# Patient Record
Sex: MALE | Race: WHITE | NOT HISPANIC OR LATINO | Employment: OTHER | URBAN - METROPOLITAN AREA
[De-identification: names, ages, dates, MRNs, and addresses within clinical notes are randomized per-mention and may not be internally consistent; named-entity substitution may affect disease eponyms.]

---

## 2021-09-20 ENCOUNTER — HISTORICAL (OUTPATIENT)
Dept: CARDIOLOGY | Facility: HOSPITAL | Age: 61
End: 2021-09-20

## 2021-10-21 ENCOUNTER — HISTORICAL (OUTPATIENT)
Dept: CARDIOLOGY | Facility: HOSPITAL | Age: 61
End: 2021-10-21

## 2021-11-22 ENCOUNTER — HISTORICAL (OUTPATIENT)
Dept: CARDIOLOGY | Facility: HOSPITAL | Age: 61
End: 2021-11-22

## 2021-12-21 ENCOUNTER — HISTORICAL (OUTPATIENT)
Dept: CARDIOLOGY | Facility: HOSPITAL | Age: 61
End: 2021-12-21

## 2021-12-23 ENCOUNTER — HISTORICAL (OUTPATIENT)
Dept: CARDIOLOGY | Facility: HOSPITAL | Age: 61
End: 2021-12-23

## 2022-01-12 ENCOUNTER — HISTORICAL (OUTPATIENT)
Dept: CARDIOLOGY | Facility: HOSPITAL | Age: 62
End: 2022-01-12

## 2022-01-12 LAB
INR PPP: 3.9 (ref 0–1.3)
PROTHROMBIN TIME: 37.5 SECOND(S) (ref 12.5–14.5)

## 2022-01-19 ENCOUNTER — HISTORICAL (OUTPATIENT)
Dept: CARDIOLOGY | Facility: HOSPITAL | Age: 62
End: 2022-01-19

## 2022-01-19 LAB
INR PPP: 1.1
PROTHROMBIN TIME: 13.5 S

## 2022-01-25 ENCOUNTER — HISTORICAL (OUTPATIENT)
Dept: ADMINISTRATIVE | Facility: HOSPITAL | Age: 62
End: 2022-01-25

## 2022-01-25 LAB
INR PPP: 2.4 (ref 0–1.3)
PROTHROMBIN TIME: 25.3 SECOND(S) (ref 12.5–14.5)

## 2022-02-02 ENCOUNTER — HISTORICAL (OUTPATIENT)
Dept: ADMINISTRATIVE | Facility: HOSPITAL | Age: 62
End: 2022-02-02

## 2022-02-02 LAB
INR PPP: 3.6 (ref 0–1.3)
PROTHROMBIN TIME: 35.3 S (ref 12.5–14.5)

## 2022-02-17 ENCOUNTER — HISTORICAL (OUTPATIENT)
Dept: ADMINISTRATIVE | Facility: HOSPITAL | Age: 62
End: 2022-02-17

## 2022-02-17 LAB
INR PPP: 2.2 (ref 0–1.3)
PROTHROMBIN TIME: 24.2 S (ref 12.5–14.5)

## 2022-03-14 ENCOUNTER — HISTORICAL (OUTPATIENT)
Dept: ADMINISTRATIVE | Facility: HOSPITAL | Age: 62
End: 2022-03-14

## 2022-03-14 LAB
INR PPP: 3.8 (ref 0–1.3)
PROTHROMBIN TIME: 37.2 S (ref 12.5–14.5)

## 2022-03-29 ENCOUNTER — HISTORICAL (OUTPATIENT)
Dept: ADMINISTRATIVE | Facility: HOSPITAL | Age: 62
End: 2022-03-29

## 2022-03-29 LAB
INR PPP: 1.3 (ref 0–1.3)
PROTHROMBIN TIME: 16.3 S (ref 12.5–14.5)

## 2022-04-13 ENCOUNTER — HISTORICAL (OUTPATIENT)
Dept: ADMINISTRATIVE | Facility: HOSPITAL | Age: 62
End: 2022-04-13
Payer: COMMERCIAL

## 2022-04-13 LAB
INR PPP: 1.4 (ref 0–1.3)
PROTHROMBIN TIME: 16.8 S (ref 12.5–14.5)

## 2022-04-22 ENCOUNTER — HISTORICAL (OUTPATIENT)
Dept: ADMINISTRATIVE | Facility: HOSPITAL | Age: 62
End: 2022-04-22
Payer: COMMERCIAL

## 2022-04-22 LAB
INR PPP: 2.4 (ref 0–1.3)
PROTHROMBIN TIME: 25.4 S (ref 12.5–14.5)

## 2022-04-27 ENCOUNTER — HISTORICAL (OUTPATIENT)
Dept: ADMINISTRATIVE | Facility: HOSPITAL | Age: 62
End: 2022-04-27
Payer: COMMERCIAL

## 2022-04-27 LAB
INR PPP: 3.4 (ref 0–1.3)
PROTHROMBIN TIME: 33.8 S (ref 12.5–14.5)

## 2022-05-11 ENCOUNTER — LAB VISIT (OUTPATIENT)
Dept: LAB | Facility: HOSPITAL | Age: 62
End: 2022-05-11
Attending: INTERNAL MEDICINE
Payer: COMMERCIAL

## 2022-05-11 DIAGNOSIS — Z95.2 HEART VALVE REPLACED BY TRANSPLANT: Primary | ICD-10-CM

## 2022-05-11 LAB
INR BLD: 3.36 (ref 0–1.3)
PROTHROMBIN TIME: 33.5 SECONDS (ref 12.5–14.5)

## 2022-05-11 PROCEDURE — 85610 PROTHROMBIN TIME: CPT

## 2022-05-11 PROCEDURE — 36415 COLL VENOUS BLD VENIPUNCTURE: CPT

## 2022-05-25 ENCOUNTER — LAB VISIT (OUTPATIENT)
Dept: LAB | Facility: HOSPITAL | Age: 62
End: 2022-05-25
Attending: INTERNAL MEDICINE
Payer: COMMERCIAL

## 2022-05-25 DIAGNOSIS — Z95.2 HEART VALVE REPLACED BY TRANSPLANT: Primary | ICD-10-CM

## 2022-05-25 LAB
INR BLD: 2.88 (ref 0–1.3)
PROTHROMBIN TIME: 29.7 SECONDS (ref 12.5–14.5)

## 2022-05-25 PROCEDURE — 85610 PROTHROMBIN TIME: CPT

## 2022-05-25 PROCEDURE — 36415 COLL VENOUS BLD VENIPUNCTURE: CPT

## 2022-06-15 ENCOUNTER — LAB VISIT (OUTPATIENT)
Dept: LAB | Facility: HOSPITAL | Age: 62
End: 2022-06-15
Attending: INTERNAL MEDICINE
Payer: COMMERCIAL

## 2022-06-15 DIAGNOSIS — Z95.2 HEART VALVE REPLACED BY TRANSPLANT: Primary | ICD-10-CM

## 2022-06-15 LAB
INR BLD: 3.95 (ref 0–1.3)
PROTHROMBIN TIME: 37.7 SECONDS (ref 12.5–14.5)

## 2022-06-15 PROCEDURE — 85610 PROTHROMBIN TIME: CPT

## 2022-06-15 PROCEDURE — 36415 COLL VENOUS BLD VENIPUNCTURE: CPT

## 2022-07-21 ENCOUNTER — LAB VISIT (OUTPATIENT)
Dept: LAB | Facility: HOSPITAL | Age: 62
End: 2022-07-21
Attending: INTERNAL MEDICINE
Payer: COMMERCIAL

## 2022-07-21 DIAGNOSIS — Z95.2 HEART VALVE REPLACED BY TRANSPLANT: Primary | ICD-10-CM

## 2022-07-21 LAB
INR BLD: 4.34 (ref 0–1.3)
PROTHROMBIN TIME: 40.5 SECONDS (ref 12.5–14.5)

## 2022-07-21 PROCEDURE — 36415 COLL VENOUS BLD VENIPUNCTURE: CPT

## 2022-07-21 PROCEDURE — 85610 PROTHROMBIN TIME: CPT

## 2022-08-21 ENCOUNTER — HOSPITAL ENCOUNTER (OUTPATIENT)
Facility: HOSPITAL | Age: 62
Discharge: HOME | DRG: 155 | End: 2022-08-21
Attending: EMERGENCY MEDICINE | Admitting: INTERNAL MEDICINE
Payer: COMMERCIAL

## 2022-08-21 VITALS
HEART RATE: 59 BPM | DIASTOLIC BLOOD PRESSURE: 71 MMHG | OXYGEN SATURATION: 96 % | TEMPERATURE: 98 F | RESPIRATION RATE: 20 BRPM | SYSTOLIC BLOOD PRESSURE: 134 MMHG

## 2022-08-21 DIAGNOSIS — R41.82 ALTERED MENTAL STATUS: ICD-10-CM

## 2022-08-21 DIAGNOSIS — G47.59 OTHER PARASOMNIA: ICD-10-CM

## 2022-08-21 DIAGNOSIS — G93.40 ACUTE ENCEPHALOPATHY: Primary | ICD-10-CM

## 2022-08-21 PROBLEM — G47.50 PARASOMNIA: Status: ACTIVE | Noted: 2022-08-21

## 2022-08-21 PROBLEM — G47.419 NARCOLEPSY: Status: ACTIVE | Noted: 2022-08-21

## 2022-08-21 LAB
ALBUMIN SERPL-MCNC: 3.3 GM/DL (ref 3.4–4.8)
ALBUMIN/GLOB SERPL: 1 RATIO (ref 1.1–2)
ALP SERPL-CCNC: 86 UNIT/L (ref 40–150)
ALT SERPL-CCNC: 10 UNIT/L (ref 0–55)
AMMONIA PLAS-MSCNC: 25.8 UMOL/L (ref 18–72)
AMPHET UR QL SCN: NEGATIVE
APPEARANCE UR: CLEAR
AST SERPL-CCNC: 13 UNIT/L (ref 5–34)
BACTERIA #/AREA URNS AUTO: NORMAL /HPF
BARBITURATE SCN PRESENT UR: NEGATIVE
BASOPHILS # BLD AUTO: 0.04 X10(3)/MCL (ref 0–0.2)
BASOPHILS NFR BLD AUTO: 0.6 %
BENZODIAZ UR QL SCN: NEGATIVE
BILIRUB UR QL STRIP.AUTO: NEGATIVE MG/DL
BILIRUBIN DIRECT+TOT PNL SERPL-MCNC: 0.3 MG/DL
BUN SERPL-MCNC: 26.2 MG/DL (ref 8.4–25.7)
CALCIUM SERPL-MCNC: 9.1 MG/DL (ref 8.8–10)
CANNABINOIDS UR QL SCN: NEGATIVE
CHLORIDE SERPL-SCNC: 105 MMOL/L (ref 98–107)
CO2 SERPL-SCNC: 25 MMOL/L (ref 23–31)
COCAINE UR QL SCN: NEGATIVE
COLOR UR AUTO: YELLOW
CORRECTED TEMPERATURE (PCO2): 46 MMHG (ref 35–45)
CORRECTED TEMPERATURE (PH): 7.4 (ref 7.35–7.45)
CORRECTED TEMPERATURE (PO2): 68 MMHG (ref 80–100)
CREAT SERPL-MCNC: 1.5 MG/DL (ref 0.73–1.18)
EOSINOPHIL # BLD AUTO: 0.21 X10(3)/MCL (ref 0–0.9)
EOSINOPHIL NFR BLD AUTO: 3.4 %
ERYTHROCYTE [DISTWIDTH] IN BLOOD BY AUTOMATED COUNT: 20.3 % (ref 11.5–17)
ETHANOL SERPL-MCNC: <10 MG/DL
FENTANYL UR QL SCN: NEGATIVE
GFR SERPLBLD CREATININE-BSD FMLA CKD-EPI: 53 MLS/MIN/1.73/M2
GLOBULIN SER-MCNC: 3.2 GM/DL (ref 2.4–3.5)
GLUCOSE SERPL-MCNC: 165 MG/DL (ref 82–115)
GLUCOSE UR QL STRIP.AUTO: NEGATIVE MG/DL
HCO3 UR-SCNC: 28.5 MMOL/L (ref 22–26)
HCT VFR BLD AUTO: 29.1 % (ref 42–52)
HGB BLD-MCNC: 8.4 GM/DL (ref 14–18)
HGB BLD-MCNC: 8.8 G/DL (ref 12–16)
IMM GRANULOCYTES # BLD AUTO: 0.02 X10(3)/MCL (ref 0–0.04)
IMM GRANULOCYTES NFR BLD AUTO: 0.3 %
INR BLD: 3.11 (ref 0–1.3)
KETONES UR QL STRIP.AUTO: NEGATIVE MG/DL
LEUKOCYTE ESTERASE UR QL STRIP.AUTO: NEGATIVE UNIT/L
LYMPHOCYTES # BLD AUTO: 1.69 X10(3)/MCL (ref 0.6–4.6)
LYMPHOCYTES NFR BLD AUTO: 27.4 %
MCH RBC QN AUTO: 26.2 PG (ref 27–31)
MCHC RBC AUTO-ENTMCNC: 28.9 MG/DL (ref 33–36)
MCV RBC AUTO: 90.7 FL (ref 80–94)
MDMA UR QL SCN: NEGATIVE
MONOCYTES # BLD AUTO: 0.66 X10(3)/MCL (ref 0.1–1.3)
MONOCYTES NFR BLD AUTO: 10.7 %
NEUTROPHILS # BLD AUTO: 3.5 X10(3)/MCL (ref 2.1–9.2)
NEUTROPHILS NFR BLD AUTO: 57.6 %
NITRITE UR QL STRIP.AUTO: NEGATIVE
NRBC BLD AUTO-RTO: 0 %
OPIATES UR QL SCN: NEGATIVE
PCO2 BLDA: 46 MMHG (ref 35–45)
PCP UR QL: NEGATIVE
PH SMN: 7.4 [PH] (ref 7.35–7.45)
PH UR STRIP.AUTO: 6 [PH]
PH UR: 6 [PH] (ref 3–11)
PLATELET # BLD AUTO: 358 X10(3)/MCL (ref 130–400)
PMV BLD AUTO: 8.8 FL (ref 7.4–10.4)
PO2 BLDA: 68 MMHG (ref 80–100)
POC BASE DEFICIT: 3.2 MMOL/L (ref -2–2)
POC COHB: 4.9 %
POC IONIZED CALCIUM: 1.17 MMOL/L (ref 1.12–1.23)
POC METHB: 0.4 % (ref 0.4–1.5)
POC O2HB: 90.8 % (ref 94–97)
POC SATURATED O2: 93.3 %
POC TEMPERATURE: 37 °C
POTASSIUM BLD-SCNC: 3.9 MMOL/L (ref 3.5–5)
POTASSIUM SERPL-SCNC: 4.2 MMOL/L (ref 3.5–5.1)
PROT SERPL-MCNC: 6.5 GM/DL (ref 5.8–7.6)
PROT UR QL STRIP.AUTO: NEGATIVE MG/DL
PROTHROMBIN TIME: 31.4 SECONDS (ref 12.5–14.5)
RBC # BLD AUTO: 3.21 X10(6)/MCL (ref 4.7–6.1)
RBC #/AREA URNS AUTO: <5 /HPF
RBC UR QL AUTO: NEGATIVE UNIT/L
SODIUM BLD-SCNC: 135 MMOL/L (ref 137–145)
SODIUM SERPL-SCNC: 139 MMOL/L (ref 136–145)
SP GR UR STRIP.AUTO: 1.01 (ref 1–1.03)
SPECIFIC GRAVITY, URINE AUTO (.000) (OHS): 1.01 (ref 1–1.03)
SPECIMEN SOURCE: ABNORMAL
SQUAMOUS #/AREA URNS AUTO: <5 /HPF
UROBILINOGEN UR STRIP-ACNC: 0.2 MG/DL
WBC # SPEC AUTO: 6.2 X10(3)/MCL (ref 4.5–11.5)
WBC #/AREA URNS AUTO: <5 /HPF

## 2022-08-21 PROCEDURE — 36600 WITHDRAWAL OF ARTERIAL BLOOD: CPT

## 2022-08-21 PROCEDURE — 93010 ELECTROCARDIOGRAM REPORT: CPT | Mod: ,,, | Performed by: INTERNAL MEDICINE

## 2022-08-21 PROCEDURE — 82077 ASSAY SPEC XCP UR&BREATH IA: CPT | Performed by: EMERGENCY MEDICINE

## 2022-08-21 PROCEDURE — 81001 URINALYSIS AUTO W/SCOPE: CPT | Performed by: EMERGENCY MEDICINE

## 2022-08-21 PROCEDURE — 99285 EMERGENCY DEPT VISIT HI MDM: CPT | Mod: 25

## 2022-08-21 PROCEDURE — 80053 COMPREHEN METABOLIC PANEL: CPT | Performed by: EMERGENCY MEDICINE

## 2022-08-21 PROCEDURE — 96360 HYDRATION IV INFUSION INIT: CPT

## 2022-08-21 PROCEDURE — 99900035 HC TECH TIME PER 15 MIN (STAT)

## 2022-08-21 PROCEDURE — 82803 BLOOD GASES ANY COMBINATION: CPT

## 2022-08-21 PROCEDURE — 25000003 PHARM REV CODE 250: Performed by: EMERGENCY MEDICINE

## 2022-08-21 PROCEDURE — G0378 HOSPITAL OBSERVATION PER HR: HCPCS

## 2022-08-21 PROCEDURE — 93010 EKG 12-LEAD: ICD-10-PCS | Mod: ,,, | Performed by: INTERNAL MEDICINE

## 2022-08-21 PROCEDURE — 36415 COLL VENOUS BLD VENIPUNCTURE: CPT | Performed by: EMERGENCY MEDICINE

## 2022-08-21 PROCEDURE — 80307 DRUG TEST PRSMV CHEM ANLYZR: CPT | Performed by: EMERGENCY MEDICINE

## 2022-08-21 PROCEDURE — 82140 ASSAY OF AMMONIA: CPT | Performed by: EMERGENCY MEDICINE

## 2022-08-21 PROCEDURE — 11000001 HC ACUTE MED/SURG PRIVATE ROOM

## 2022-08-21 PROCEDURE — 85610 PROTHROMBIN TIME: CPT | Performed by: EMERGENCY MEDICINE

## 2022-08-21 PROCEDURE — 85025 COMPLETE CBC W/AUTO DIFF WBC: CPT | Performed by: EMERGENCY MEDICINE

## 2022-08-21 PROCEDURE — 93005 ELECTROCARDIOGRAM TRACING: CPT

## 2022-08-21 RX ORDER — WARFARIN 2.5 MG/1
2.5 TABLET ORAL
COMMUNITY
Start: 2021-09-20

## 2022-08-21 RX ORDER — DULAGLUTIDE 1.5 MG/.5ML
1 INJECTION, SOLUTION SUBCUTANEOUS WEEKLY
COMMUNITY
Start: 2022-08-15

## 2022-08-21 RX ORDER — PAROXETINE HYDROCHLORIDE 20 MG/1
20 TABLET, FILM COATED ORAL EVERY MORNING
COMMUNITY

## 2022-08-21 RX ORDER — PANTOPRAZOLE SODIUM 40 MG/1
1 TABLET, DELAYED RELEASE ORAL EVERY MORNING
COMMUNITY
Start: 2022-07-02 | End: 2023-07-02

## 2022-08-21 RX ORDER — ASPIRIN 81 MG/1
1 TABLET ORAL DAILY
COMMUNITY
Start: 2021-09-20

## 2022-08-21 RX ORDER — LISINOPRIL 40 MG/1
40 TABLET ORAL
Status: ON HOLD | COMMUNITY
End: 2022-09-13 | Stop reason: HOSPADM

## 2022-08-21 RX ORDER — FERROUS SULFATE 325(65) MG
1 TABLET, DELAYED RELEASE (ENTERIC COATED) ORAL
COMMUNITY
Start: 2022-07-04

## 2022-08-21 RX ORDER — ATORVASTATIN CALCIUM 80 MG/1
80 TABLET, FILM COATED ORAL NIGHTLY
COMMUNITY
Start: 2021-09-20

## 2022-08-21 RX ORDER — METHADONE HYDROCHLORIDE 10 MG/5ML
70 SOLUTION ORAL
COMMUNITY

## 2022-08-21 RX ORDER — GABAPENTIN 400 MG/1
400 CAPSULE ORAL 3 TIMES DAILY
COMMUNITY
Start: 2022-06-06

## 2022-08-21 RX ORDER — METFORMIN HYDROCHLORIDE 1000 MG/1
1000 TABLET ORAL
Status: ON HOLD | COMMUNITY
Start: 2021-09-20 | End: 2022-09-13 | Stop reason: HOSPADM

## 2022-08-21 RX ORDER — ALBUTEROL SULFATE 0.83 MG/ML
SOLUTION RESPIRATORY (INHALATION)
Status: DISCONTINUED
Start: 2022-08-21 | End: 2022-08-21 | Stop reason: HOSPADM

## 2022-08-21 RX ORDER — FOLIC ACID 1 MG/1
1000 TABLET ORAL DAILY
COMMUNITY
Start: 2022-08-15

## 2022-08-21 RX ADMIN — SODIUM CHLORIDE 1000 ML: 9 INJECTION, SOLUTION INTRAVENOUS at 01:08
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
## 2022-08-22 ENCOUNTER — PATIENT OUTREACH (OUTPATIENT)
Dept: ADMINISTRATIVE | Facility: CLINIC | Age: 62
End: 2022-08-22
Payer: COMMERCIAL

## 2022-08-22 NOTE — PROGRESS NOTES
C3 nurse attempted to contact Christian Stroud for a TCC post hospital discharge follow up call. No answer. No voicemail available.The patient does not have a scheduled HOSFU appointment noted.

## 2022-08-23 NOTE — CLINICAL REVIEW
61-year-old male with DM 2, HTN, narcolepsy, opioid addiction who presented with acute encephalopathy after taking 2 Ambien pills.  He was admitted.  Workup was unremarkable.  Discharged within 24 hours after complete resolution of his symptoms.  There was no evidence of acute stroke, hypertensive encephalopathy, acute coronary syndrome, acute infection, failure of observation level of care.  He was appropriate for observation level of care    Maria Del Carmen Otero MD  Utilization Management  Physician Advisor

## 2022-08-23 NOTE — PROGRESS NOTES
C3 nurse spoke with Christian Stroud for a TCC post hospital discharge follow up call. The patient does not have a scheduled HOSFU appointment scheduled. Stated he will call JOAQUIN Russell and Dr. Rivera's offices today to schedule hospital follow up appointments.

## 2022-08-25 NOTE — PHYSICIAN QUERY
PT Name: Christian Stroud  MR #: [id number]    DOCUMENTATION CLARIFICATION     CDS/: Vale DE LA TORRE, RN              Contact information: [email or web address]  This form is a permanent document in the medical record.     Query Date: August 24, 2022    By submitting this query, we are merely seeking further clarification of documentation. Please utilize your independent clinical judgment when addressing the question(s) below.    The Medical Record contains the following:   Indicators   Supporting Clinical Findings Location in Medical Record   x AMS, Confusion,  LOC, etc.    Altered Mental Status      Pt states took 2 Ambien tonight     62 yo male altered, disoriented, incoherent.      [G93.40] Acute encephalopathy (Primary)    Hospital Medicine H&P 8/21     Emergency Medicine Provider Note 8/21/22   x Acute/Chronic Illness  61-year-old male with DM 2, HTN, narcolepsy, opioid addiction who presented with acute encephalopathy after taking 2 Ambien pills.  Utilization Management Clinical Review 8/23/22    Radiology Findings      Electrolyte Imbalance      Medication     x Treatment           He was admitted.  Workup was unremarkable.  Discharged within 24 hours after complete resolution of his symptoms.  There was no evidence of acute stroke, hypertensive encephalopathy  Utilization Management Clinical Review 8/23/22    Other       The noted clinical guidelines are only system guidelines and do not replace the providers clinical judgment.    The National [place] of Neurologic Disorders and Stroke (NINDS) of the NIH describes encephalopathy as any diffuse disease of the brain that alters brain function or structure.    Provider, please specify the type of encephalopathy associated with above clinical findings. Thank you.    [   ]  Toxic Encephalopathy - Due to drugs, chemicals, or other toxic substances     [   ]  Encephalopathy, unspecified        [   ]  Other Encephalopathy (please specify):  ____________________     [   ]  Other neurological condition- (please specify  condition): __________     [ x  ]  Clinically Undetermined     Please document in your progress notes daily for the duration of treatment until resolved, and include in your discharge summary.    References:  PEPE Pittman RN, CCDS. (2018, June 9). Notes from the Instructor: Encephalopathy tips. Retrieved October 22, 2020, from [email or web address]://acdis.org/articles/note-instructor-encephalopathy-tips    ICD-9-CM Coding Clinic First Quarter 2013, Effective with discharges: October 21, 2013 [facility] § Seizure with encephalopathy due to postictal state (2013).    ICD-10-CM/[facility] Integrated Codebook (Version V.20.8.10.0) [Computer software]. (2020). Retrieved October 21, 2020.    National [place] of Neurological Disorders and Stroke. (2019, March 27). Retrieved October 22, 2020, from https://www.ninds.nih.gov/Disorders/All-Disorders/Aipzsommllkzcm-Baesukssthc-Mvtf    Form No. 52058

## 2022-09-04 ENCOUNTER — HOSPITAL ENCOUNTER (INPATIENT)
Facility: HOSPITAL | Age: 62
LOS: 9 days | Discharge: PSYCHIATRIC OR SUBSTANCE USE FACILITY | DRG: 885 | End: 2022-09-13
Attending: EMERGENCY MEDICINE | Admitting: INTERNAL MEDICINE
Payer: COMMERCIAL

## 2022-09-04 DIAGNOSIS — F11.20 METHADONE MAINTENANCE THERAPY PATIENT: ICD-10-CM

## 2022-09-04 DIAGNOSIS — Z79.4 UNCONTROLLED DIABETES MELLITUS WITH HYPERGLYCEMIA, WITH LONG-TERM CURRENT USE OF INSULIN: ICD-10-CM

## 2022-09-04 DIAGNOSIS — F31.12 BIPOLAR DISORDER, CURR EPISODE MANIC W/O PSYCHOTIC FEATURES, MODERATE: ICD-10-CM

## 2022-09-04 DIAGNOSIS — G47.20 CIRCADIAN RHYTHM SLEEP DISTURBANCE: ICD-10-CM

## 2022-09-04 DIAGNOSIS — R41.0 DELIRIUM: ICD-10-CM

## 2022-09-04 DIAGNOSIS — F05 SUNDOWNING: Primary | ICD-10-CM

## 2022-09-04 DIAGNOSIS — F11.11 HISTORY OF OPIOID ABUSE: ICD-10-CM

## 2022-09-04 DIAGNOSIS — E11.65 UNCONTROLLED DIABETES MELLITUS WITH HYPERGLYCEMIA, WITH LONG-TERM CURRENT USE OF INSULIN: ICD-10-CM

## 2022-09-04 DIAGNOSIS — R41.82 AMS (ALTERED MENTAL STATUS): ICD-10-CM

## 2022-09-04 DIAGNOSIS — R07.9 CHEST PAIN: ICD-10-CM

## 2022-09-04 LAB
ALBUMIN SERPL-MCNC: 3.8 GM/DL (ref 3.4–4.8)
ALBUMIN/GLOB SERPL: 0.9 RATIO (ref 1.1–2)
ALP SERPL-CCNC: 129 UNIT/L (ref 40–150)
ALT SERPL-CCNC: 12 UNIT/L (ref 0–55)
AMPHET UR QL SCN: NEGATIVE
APAP SERPL-MCNC: <17.4 UG/ML (ref 17.4–30)
APPEARANCE UR: CLEAR
AST SERPL-CCNC: 13 UNIT/L (ref 5–34)
BACTERIA #/AREA URNS AUTO: NORMAL /HPF
BARBITURATE SCN PRESENT UR: NEGATIVE
BASOPHILS # BLD AUTO: 0.03 X10(3)/MCL (ref 0–0.2)
BASOPHILS NFR BLD AUTO: 0.5 %
BENZODIAZ UR QL SCN: NEGATIVE
BILIRUB UR QL STRIP.AUTO: NEGATIVE MG/DL
BILIRUBIN DIRECT+TOT PNL SERPL-MCNC: 0.4 MG/DL
BUN SERPL-MCNC: 22.5 MG/DL (ref 8.4–25.7)
CALCIUM SERPL-MCNC: 9.4 MG/DL (ref 8.8–10)
CANNABINOIDS UR QL SCN: NEGATIVE
CHLORIDE SERPL-SCNC: 97 MMOL/L (ref 98–107)
CO2 SERPL-SCNC: 28 MMOL/L (ref 23–31)
COCAINE UR QL SCN: NEGATIVE
COLOR UR AUTO: YELLOW
CORRECTED TEMPERATURE (PCO2): 45 MMHG (ref 35–45)
CORRECTED TEMPERATURE (PH): 7.42 (ref 7.35–7.45)
CORRECTED TEMPERATURE (PO2): 95 MMHG (ref 80–100)
CREAT SERPL-MCNC: 1.22 MG/DL (ref 0.73–1.18)
EOSINOPHIL # BLD AUTO: 0.25 X10(3)/MCL (ref 0–0.9)
EOSINOPHIL NFR BLD AUTO: 4.2 %
ERYTHROCYTE [DISTWIDTH] IN BLOOD BY AUTOMATED COUNT: 16.9 % (ref 11.5–17)
FENTANYL UR QL SCN: NEGATIVE
GFR SERPLBLD CREATININE-BSD FMLA CKD-EPI: >60 MLS/MIN/1.73/M2
GLOBULIN SER-MCNC: 4.4 GM/DL (ref 2.4–3.5)
GLUCOSE SERPL-MCNC: 426 MG/DL (ref 82–115)
GLUCOSE UR QL STRIP.AUTO: ABNORMAL MG/DL
HCO3 UR-SCNC: 29.2 MMOL/L (ref 22–26)
HCT VFR BLD AUTO: 36.8 % (ref 42–52)
HGB BLD-MCNC: 10.6 G/DL (ref 12–16)
HGB BLD-MCNC: 10.9 GM/DL (ref 14–18)
IMM GRANULOCYTES # BLD AUTO: 0.02 X10(3)/MCL (ref 0–0.04)
IMM GRANULOCYTES NFR BLD AUTO: 0.3 %
KETONES UR QL STRIP.AUTO: NEGATIVE MG/DL
LEUKOCYTE ESTERASE UR QL STRIP.AUTO: NEGATIVE UNIT/L
LYMPHOCYTES # BLD AUTO: 1.1 X10(3)/MCL (ref 0.6–4.6)
LYMPHOCYTES NFR BLD AUTO: 18.5 %
MAGNESIUM SERPL-MCNC: 1.9 MG/DL (ref 1.6–2.6)
MCH RBC QN AUTO: 26.6 PG (ref 27–31)
MCHC RBC AUTO-ENTMCNC: 29.6 MG/DL (ref 33–36)
MCV RBC AUTO: 89.8 FL (ref 80–94)
MDMA UR QL SCN: NEGATIVE
MONOCYTES # BLD AUTO: 0.5 X10(3)/MCL (ref 0.1–1.3)
MONOCYTES NFR BLD AUTO: 8.4 %
NEUTROPHILS # BLD AUTO: 4.1 X10(3)/MCL (ref 2.1–9.2)
NEUTROPHILS NFR BLD AUTO: 68.1 %
NITRITE UR QL STRIP.AUTO: NEGATIVE
NRBC BLD AUTO-RTO: 0 %
OPIATES UR QL SCN: NEGATIVE
PCO2 BLDA: 45 MMHG (ref 35–45)
PCP UR QL: NEGATIVE
PH SMN: 7.42 [PH] (ref 7.35–7.45)
PH UR STRIP.AUTO: 5.5 [PH]
PH UR: 5.5 [PH] (ref 3–11)
PLATELET # BLD AUTO: 239 X10(3)/MCL (ref 130–400)
PMV BLD AUTO: 9.5 FL (ref 7.4–10.4)
PO2 BLDA: 95 MMHG (ref 80–100)
POC BASE DEFICIT: 4.1 MMOL/L (ref -2–2)
POC COHB: 4.9 %
POC IONIZED CALCIUM: 1.1 MMOL/L (ref 1.12–1.23)
POC METHB: 0.9 % (ref 0.4–1.5)
POC O2HB: 94.1 % (ref 94–97)
POC SATURATED O2: 97.5 %
POC TEMPERATURE: 37 °C
POCT GLUCOSE: 352 MG/DL (ref 70–110)
POTASSIUM BLD-SCNC: 4.3 MMOL/L (ref 3.5–5)
POTASSIUM SERPL-SCNC: 4.5 MMOL/L (ref 3.5–5.1)
PROT SERPL-MCNC: 8.2 GM/DL (ref 5.8–7.6)
PROT UR QL STRIP.AUTO: NEGATIVE MG/DL
RBC # BLD AUTO: 4.1 X10(6)/MCL (ref 4.7–6.1)
RBC #/AREA URNS AUTO: <5 /HPF
RBC UR QL AUTO: NEGATIVE UNIT/L
SALICYLATES SERPL-MCNC: <5 MG/DL
SODIUM BLD-SCNC: 132 MMOL/L (ref 137–145)
SODIUM SERPL-SCNC: 133 MMOL/L (ref 136–145)
SP GR UR STRIP.AUTO: 1.02 (ref 1–1.03)
SPECIFIC GRAVITY, URINE AUTO (.000) (OHS): 1.02 (ref 1–1.03)
SPECIMEN SOURCE: ABNORMAL
SQUAMOUS #/AREA URNS AUTO: <5 /HPF
T4 FREE SERPL-MCNC: 0.77 NG/DL (ref 0.7–1.48)
TSH SERPL-ACNC: 1.15 UIU/ML (ref 0.35–4.94)
UROBILINOGEN UR STRIP-ACNC: 0.2 MG/DL
WBC # SPEC AUTO: 6 X10(3)/MCL (ref 4.5–11.5)
WBC #/AREA URNS AUTO: <5 /HPF

## 2022-09-04 PROCEDURE — 99900035 HC TECH TIME PER 15 MIN (STAT)

## 2022-09-04 PROCEDURE — 63600175 PHARM REV CODE 636 W HCPCS: Performed by: EMERGENCY MEDICINE

## 2022-09-04 PROCEDURE — 83735 ASSAY OF MAGNESIUM: CPT | Performed by: PHYSICIAN ASSISTANT

## 2022-09-04 PROCEDURE — 36415 COLL VENOUS BLD VENIPUNCTURE: CPT | Performed by: PHYSICIAN ASSISTANT

## 2022-09-04 PROCEDURE — 80053 COMPREHEN METABOLIC PANEL: CPT | Performed by: PHYSICIAN ASSISTANT

## 2022-09-04 PROCEDURE — 84443 ASSAY THYROID STIM HORMONE: CPT | Performed by: PHYSICIAN ASSISTANT

## 2022-09-04 PROCEDURE — 80179 DRUG ASSAY SALICYLATE: CPT | Performed by: PHYSICIAN ASSISTANT

## 2022-09-04 PROCEDURE — 81001 URINALYSIS AUTO W/SCOPE: CPT | Performed by: PHYSICIAN ASSISTANT

## 2022-09-04 PROCEDURE — 11000001 HC ACUTE MED/SURG PRIVATE ROOM

## 2022-09-04 PROCEDURE — 85025 COMPLETE CBC W/AUTO DIFF WBC: CPT | Performed by: PHYSICIAN ASSISTANT

## 2022-09-04 PROCEDURE — 25000003 PHARM REV CODE 250: Performed by: NURSE PRACTITIONER

## 2022-09-04 PROCEDURE — 82803 BLOOD GASES ANY COMBINATION: CPT

## 2022-09-04 PROCEDURE — 99285 EMERGENCY DEPT VISIT HI MDM: CPT | Mod: 25

## 2022-09-04 PROCEDURE — 96360 HYDRATION IV INFUSION INIT: CPT

## 2022-09-04 PROCEDURE — 80307 DRUG TEST PRSMV CHEM ANLYZR: CPT | Performed by: PHYSICIAN ASSISTANT

## 2022-09-04 PROCEDURE — 84439 ASSAY OF FREE THYROXINE: CPT | Performed by: PHYSICIAN ASSISTANT

## 2022-09-04 PROCEDURE — 36600 WITHDRAWAL OF ARTERIAL BLOOD: CPT

## 2022-09-04 PROCEDURE — 80143 DRUG ASSAY ACETAMINOPHEN: CPT | Performed by: PHYSICIAN ASSISTANT

## 2022-09-04 RX ORDER — WARFARIN 2.5 MG/1
2.5 TABLET ORAL
Status: DISCONTINUED | OUTPATIENT
Start: 2022-09-07 | End: 2022-09-05

## 2022-09-04 RX ORDER — WARFARIN SODIUM 5 MG/1
5 TABLET ORAL DAILY
Status: DISCONTINUED | OUTPATIENT
Start: 2022-09-05 | End: 2022-09-04

## 2022-09-04 RX ORDER — PANTOPRAZOLE SODIUM 40 MG/1
40 TABLET, DELAYED RELEASE ORAL EVERY MORNING
Status: DISCONTINUED | OUTPATIENT
Start: 2022-09-05 | End: 2022-09-13 | Stop reason: HOSPADM

## 2022-09-04 RX ORDER — GLUCAGON 1 MG
1 KIT INJECTION
Status: DISCONTINUED | OUTPATIENT
Start: 2022-09-04 | End: 2022-09-13 | Stop reason: HOSPADM

## 2022-09-04 RX ORDER — WARFARIN SODIUM 5 MG/1
5 TABLET ORAL
Status: DISCONTINUED | OUTPATIENT
Start: 2022-09-05 | End: 2022-09-05

## 2022-09-04 RX ORDER — LISINOPRIL 20 MG/1
40 TABLET ORAL DAILY
Status: DISCONTINUED | OUTPATIENT
Start: 2022-09-05 | End: 2022-09-07

## 2022-09-04 RX ORDER — PAROXETINE HYDROCHLORIDE 20 MG/1
20 TABLET, FILM COATED ORAL EVERY MORNING
Status: DISCONTINUED | OUTPATIENT
Start: 2022-09-05 | End: 2022-09-13 | Stop reason: HOSPADM

## 2022-09-04 RX ORDER — METHADONE HYDROCHLORIDE 10 MG/1
70 TABLET ORAL DAILY
Status: DISCONTINUED | OUTPATIENT
Start: 2022-09-05 | End: 2022-09-11

## 2022-09-04 RX ORDER — IBUPROFEN 200 MG
24 TABLET ORAL
Status: DISCONTINUED | OUTPATIENT
Start: 2022-09-04 | End: 2022-09-13 | Stop reason: HOSPADM

## 2022-09-04 RX ORDER — INSULIN ASPART 100 [IU]/ML
1-10 INJECTION, SOLUTION INTRAVENOUS; SUBCUTANEOUS
Status: DISCONTINUED | OUTPATIENT
Start: 2022-09-04 | End: 2022-09-09

## 2022-09-04 RX ORDER — SODIUM CHLORIDE 0.9 % (FLUSH) 0.9 %
10 SYRINGE (ML) INJECTION EVERY 12 HOURS PRN
Status: DISCONTINUED | OUTPATIENT
Start: 2022-09-04 | End: 2022-09-13 | Stop reason: HOSPADM

## 2022-09-04 RX ORDER — IBUPROFEN 200 MG
16 TABLET ORAL
Status: DISCONTINUED | OUTPATIENT
Start: 2022-09-04 | End: 2022-09-13 | Stop reason: HOSPADM

## 2022-09-04 RX ORDER — NALOXONE HCL 0.4 MG/ML
0.02 VIAL (ML) INJECTION
Status: DISCONTINUED | OUTPATIENT
Start: 2022-09-04 | End: 2022-09-13 | Stop reason: HOSPADM

## 2022-09-04 RX ORDER — ATORVASTATIN CALCIUM 40 MG/1
80 TABLET, FILM COATED ORAL NIGHTLY
Status: DISCONTINUED | OUTPATIENT
Start: 2022-09-04 | End: 2022-09-13 | Stop reason: HOSPADM

## 2022-09-04 RX ORDER — FOLIC ACID 1 MG/1
1000 TABLET ORAL DAILY
Status: DISCONTINUED | OUTPATIENT
Start: 2022-09-05 | End: 2022-09-13 | Stop reason: HOSPADM

## 2022-09-04 RX ORDER — HALOPERIDOL 5 MG/ML
5 INJECTION INTRAMUSCULAR EVERY 6 HOURS PRN
Status: DISCONTINUED | OUTPATIENT
Start: 2022-09-04 | End: 2022-09-13 | Stop reason: HOSPADM

## 2022-09-04 RX ORDER — ASPIRIN 81 MG/1
81 TABLET ORAL DAILY
Status: DISCONTINUED | OUTPATIENT
Start: 2022-09-05 | End: 2022-09-13 | Stop reason: HOSPADM

## 2022-09-04 RX ORDER — LANOLIN ALCOHOL/MO/W.PET/CERES
1 CREAM (GRAM) TOPICAL
Status: DISCONTINUED | OUTPATIENT
Start: 2022-09-05 | End: 2022-09-13 | Stop reason: HOSPADM

## 2022-09-04 RX ADMIN — SODIUM CHLORIDE, POTASSIUM CHLORIDE, SODIUM LACTATE AND CALCIUM CHLORIDE 1000 ML: 600; 310; 30; 20 INJECTION, SOLUTION INTRAVENOUS at 05:09

## 2022-09-04 RX ADMIN — GABAPENTIN 400 MG: 300 CAPSULE ORAL at 11:09
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
## 2022-09-04 NOTE — FIRST PROVIDER EVALUATION
"Medical screening exam completed.  I have conducted a focused provider triage encounter, findings are as follows:    Brief history of present illness:  61 year old male with hx of narcolepsy presents to ED for erratic behavior, worse at night; Denies HI SI     Vitals:    09/04/22 1440   BP: 128/68   BP Location: Left arm   Patient Position: Sitting   Pulse: 79   Resp: 18   Temp: 98.2 °F (36.8 °C)   TempSrc: Oral   SpO2: 97%   Weight: 90.7 kg (200 lb)   Height: 5' 10" (1.778 m)       Pertinent physical exam:  awake, alert     Brief workup plan:  cbc, cmp, ua, uds, tsh, free t4, mg, acetaminophen/salicylate level     Preliminary workup initiated; this workup will be continued and followed by the physician or advanced practice provider that is assigned to the patient when roomed.  "
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
## 2022-09-05 PROBLEM — R41.0 DELIRIUM: Status: ACTIVE | Noted: 2022-09-05

## 2022-09-05 PROBLEM — F31.12: Status: ACTIVE | Noted: 2022-09-05

## 2022-09-05 LAB
ALBUMIN SERPL-MCNC: 3.1 GM/DL (ref 3.4–4.8)
ALBUMIN/GLOB SERPL: 0.8 RATIO (ref 1.1–2)
ALP SERPL-CCNC: 99 UNIT/L (ref 40–150)
ALT SERPL-CCNC: 10 UNIT/L (ref 0–55)
AST SERPL-CCNC: 12 UNIT/L (ref 5–34)
BASOPHILS # BLD AUTO: 0.04 X10(3)/MCL (ref 0–0.2)
BASOPHILS NFR BLD AUTO: 0.8 %
BILIRUBIN DIRECT+TOT PNL SERPL-MCNC: 0.3 MG/DL
BUN SERPL-MCNC: 21.3 MG/DL (ref 8.4–25.7)
CALCIUM SERPL-MCNC: 9 MG/DL (ref 8.8–10)
CHLORIDE SERPL-SCNC: 102 MMOL/L (ref 98–107)
CO2 SERPL-SCNC: 26 MMOL/L (ref 23–31)
CREAT SERPL-MCNC: 0.93 MG/DL (ref 0.73–1.18)
EOSINOPHIL # BLD AUTO: 0.35 X10(3)/MCL (ref 0–0.9)
EOSINOPHIL NFR BLD AUTO: 7.2 %
ERYTHROCYTE [DISTWIDTH] IN BLOOD BY AUTOMATED COUNT: 16.8 % (ref 11.5–17)
GFR SERPLBLD CREATININE-BSD FMLA CKD-EPI: >60 MLS/MIN/1.73/M2
GLOBULIN SER-MCNC: 3.8 GM/DL (ref 2.4–3.5)
GLUCOSE SERPL-MCNC: 238 MG/DL (ref 70–110)
GLUCOSE SERPL-MCNC: 266 MG/DL (ref 82–115)
HCT VFR BLD AUTO: 33.8 % (ref 42–52)
HGB BLD-MCNC: 10 GM/DL (ref 14–18)
IMM GRANULOCYTES # BLD AUTO: 0.02 X10(3)/MCL (ref 0–0.04)
IMM GRANULOCYTES NFR BLD AUTO: 0.4 %
INR BLD: 6.44 (ref 0–1.3)
LYMPHOCYTES # BLD AUTO: 1.73 X10(3)/MCL (ref 0.6–4.6)
LYMPHOCYTES NFR BLD AUTO: 35.5 %
MAGNESIUM SERPL-MCNC: 1.9 MG/DL (ref 1.6–2.6)
MCH RBC QN AUTO: 25.8 PG (ref 27–31)
MCHC RBC AUTO-ENTMCNC: 29.6 MG/DL (ref 33–36)
MCV RBC AUTO: 87.3 FL (ref 80–94)
MONOCYTES # BLD AUTO: 0.43 X10(3)/MCL (ref 0.1–1.3)
MONOCYTES NFR BLD AUTO: 8.8 %
NEUTROPHILS # BLD AUTO: 2.3 X10(3)/MCL (ref 2.1–9.2)
NEUTROPHILS NFR BLD AUTO: 47.3 %
NRBC BLD AUTO-RTO: 0 %
PHOSPHATE SERPL-MCNC: 3.2 MG/DL (ref 2.3–4.7)
PLATELET # BLD AUTO: 222 X10(3)/MCL (ref 130–400)
PMV BLD AUTO: 9.7 FL (ref 7.4–10.4)
POCT GLUCOSE: 472 MG/DL (ref 70–110)
POTASSIUM SERPL-SCNC: 4 MMOL/L (ref 3.5–5.1)
PROT SERPL-MCNC: 6.9 GM/DL (ref 5.8–7.6)
PROTHROMBIN TIME: 54.9 SECONDS (ref 12.5–14.5)
RBC # BLD AUTO: 3.87 X10(6)/MCL (ref 4.7–6.1)
SODIUM SERPL-SCNC: 136 MMOL/L (ref 136–145)
WBC # SPEC AUTO: 4.9 X10(3)/MCL (ref 4.5–11.5)

## 2022-09-05 PROCEDURE — 84100 ASSAY OF PHOSPHORUS: CPT | Performed by: NURSE PRACTITIONER

## 2022-09-05 PROCEDURE — 85610 PROTHROMBIN TIME: CPT | Performed by: NURSE PRACTITIONER

## 2022-09-05 PROCEDURE — 25000003 PHARM REV CODE 250: Performed by: NURSE PRACTITIONER

## 2022-09-05 PROCEDURE — 11000001 HC ACUTE MED/SURG PRIVATE ROOM

## 2022-09-05 PROCEDURE — 63600175 PHARM REV CODE 636 W HCPCS: Performed by: NURSE PRACTITIONER

## 2022-09-05 PROCEDURE — 83735 ASSAY OF MAGNESIUM: CPT | Performed by: NURSE PRACTITIONER

## 2022-09-05 PROCEDURE — 85025 COMPLETE CBC W/AUTO DIFF WBC: CPT | Performed by: NURSE PRACTITIONER

## 2022-09-05 PROCEDURE — 63600175 PHARM REV CODE 636 W HCPCS: Performed by: INTERNAL MEDICINE

## 2022-09-05 PROCEDURE — 25000003 PHARM REV CODE 250: Performed by: INTERNAL MEDICINE

## 2022-09-05 PROCEDURE — 36415 COLL VENOUS BLD VENIPUNCTURE: CPT | Performed by: NURSE PRACTITIONER

## 2022-09-05 PROCEDURE — 80053 COMPREHEN METABOLIC PANEL: CPT | Performed by: NURSE PRACTITIONER

## 2022-09-05 RX ORDER — DIVALPROEX SODIUM 250 MG/1
250 TABLET, FILM COATED, EXTENDED RELEASE ORAL NIGHTLY
Status: DISCONTINUED | OUTPATIENT
Start: 2022-09-05 | End: 2022-09-08

## 2022-09-05 RX ORDER — DIPHENHYDRAMINE HCL 25 MG
50 CAPSULE ORAL EVERY 12 HOURS PRN
Status: DISCONTINUED | OUTPATIENT
Start: 2022-09-05 | End: 2022-09-09

## 2022-09-05 RX ADMIN — FERROUS SULFATE TAB 325 MG (65 MG ELEMENTAL FE) 1 EACH: 325 (65 FE) TAB at 12:09

## 2022-09-05 RX ADMIN — PAROXETINE HYDROCHLORIDE 20 MG: 20 TABLET, FILM COATED ORAL at 06:09

## 2022-09-05 RX ADMIN — FOLIC ACID 1000 MCG: 1 TABLET ORAL at 09:09

## 2022-09-05 RX ADMIN — FERROUS SULFATE TAB 325 MG (65 MG ELEMENTAL FE) 1 EACH: 325 (65 FE) TAB at 04:09

## 2022-09-05 RX ADMIN — DIVALPROEX SODIUM 250 MG: 250 TABLET, FILM COATED, EXTENDED RELEASE ORAL at 09:09

## 2022-09-05 RX ADMIN — INSULIN ASPART 5 UNITS: 100 INJECTION, SOLUTION INTRAVENOUS; SUBCUTANEOUS at 09:09

## 2022-09-05 RX ADMIN — INSULIN ASPART 10 UNITS: 100 INJECTION, SOLUTION INTRAVENOUS; SUBCUTANEOUS at 12:09

## 2022-09-05 RX ADMIN — PANTOPRAZOLE SODIUM 40 MG: 40 TABLET, DELAYED RELEASE ORAL at 06:09

## 2022-09-05 RX ADMIN — GABAPENTIN 400 MG: 300 CAPSULE ORAL at 09:09

## 2022-09-05 RX ADMIN — GABAPENTIN 400 MG: 300 CAPSULE ORAL at 02:09

## 2022-09-05 RX ADMIN — ASPIRIN 81 MG: 81 TABLET, COATED ORAL at 06:09

## 2022-09-05 RX ADMIN — FERROUS SULFATE TAB 325 MG (65 MG ELEMENTAL FE) 1 EACH: 325 (65 FE) TAB at 07:09

## 2022-09-05 RX ADMIN — INSULIN ASPART 10 UNITS: 100 INJECTION, SOLUTION INTRAVENOUS; SUBCUTANEOUS at 04:09

## 2022-09-05 RX ADMIN — CEFTRIAXONE SODIUM 1 G: 1 INJECTION, POWDER, FOR SOLUTION INTRAMUSCULAR; INTRAVENOUS at 01:09

## 2022-09-05 RX ADMIN — LISINOPRIL 40 MG: 20 TABLET ORAL at 09:09

## 2022-09-05 RX ADMIN — INSULIN DETEMIR 10 UNITS: 100 INJECTION, SOLUTION SUBCUTANEOUS at 09:09

## 2022-09-05 RX ADMIN — METHADONE HYDROCHLORIDE 70 MG: 10 TABLET ORAL at 06:09

## 2022-09-05 RX ADMIN — INSULIN HUMAN 7 UNITS: 100 INJECTION, SOLUTION PARENTERAL at 11:09

## 2022-09-05 RX ADMIN — ATORVASTATIN CALCIUM 80 MG: 40 TABLET, FILM COATED ORAL at 09:09
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
## 2022-09-06 LAB
INR BLD: 5.27 (ref 0–1.3)
POCT GLUCOSE: 202 MG/DL (ref 70–110)
POCT GLUCOSE: 260 MG/DL (ref 70–110)
POCT GLUCOSE: 321 MG/DL (ref 70–110)
POCT GLUCOSE: 332 MG/DL (ref 70–110)
POCT GLUCOSE: 392 MG/DL (ref 70–110)
POCT GLUCOSE: 420 MG/DL (ref 70–110)
POCT GLUCOSE: >500 MG/DL (ref 70–110)
PROTHROMBIN TIME: 47 SECONDS (ref 12.5–14.5)

## 2022-09-06 PROCEDURE — 11000001 HC ACUTE MED/SURG PRIVATE ROOM

## 2022-09-06 PROCEDURE — 36415 COLL VENOUS BLD VENIPUNCTURE: CPT | Performed by: INTERNAL MEDICINE

## 2022-09-06 PROCEDURE — 25000003 PHARM REV CODE 250: Performed by: NURSE PRACTITIONER

## 2022-09-06 PROCEDURE — 63600175 PHARM REV CODE 636 W HCPCS: Performed by: INTERNAL MEDICINE

## 2022-09-06 PROCEDURE — 63600175 PHARM REV CODE 636 W HCPCS: Performed by: NURSE PRACTITIONER

## 2022-09-06 PROCEDURE — 85610 PROTHROMBIN TIME: CPT | Performed by: INTERNAL MEDICINE

## 2022-09-06 PROCEDURE — 25000003 PHARM REV CODE 250: Performed by: INTERNAL MEDICINE

## 2022-09-06 RX ORDER — METFORMIN HYDROCHLORIDE 500 MG/1
1000 TABLET ORAL 2 TIMES DAILY WITH MEALS
Status: DISCONTINUED | OUTPATIENT
Start: 2022-09-06 | End: 2022-09-08

## 2022-09-06 RX ORDER — GLIPIZIDE 5 MG/1
5 TABLET ORAL 2 TIMES DAILY WITH MEALS
Status: DISCONTINUED | OUTPATIENT
Start: 2022-09-06 | End: 2022-09-13 | Stop reason: HOSPADM

## 2022-09-06 RX ORDER — SODIUM CHLORIDE 9 MG/ML
INJECTION, SOLUTION INTRAVENOUS CONTINUOUS
Status: DISCONTINUED | OUTPATIENT
Start: 2022-09-06 | End: 2022-09-10

## 2022-09-06 RX ADMIN — INSULIN DETEMIR 20 UNITS: 100 INJECTION, SOLUTION SUBCUTANEOUS at 11:09

## 2022-09-06 RX ADMIN — LISINOPRIL 40 MG: 20 TABLET ORAL at 08:09

## 2022-09-06 RX ADMIN — FERROUS SULFATE TAB 325 MG (65 MG ELEMENTAL FE) 1 EACH: 325 (65 FE) TAB at 01:09

## 2022-09-06 RX ADMIN — PAROXETINE HYDROCHLORIDE 20 MG: 20 TABLET, FILM COATED ORAL at 06:09

## 2022-09-06 RX ADMIN — METFORMIN HYDROCHLORIDE 1000 MG: 500 TABLET, FILM COATED ORAL at 04:09

## 2022-09-06 RX ADMIN — DIVALPROEX SODIUM 250 MG: 250 TABLET, FILM COATED, EXTENDED RELEASE ORAL at 08:09

## 2022-09-06 RX ADMIN — GLIPIZIDE 5 MG: 5 TABLET ORAL at 04:09

## 2022-09-06 RX ADMIN — GABAPENTIN 400 MG: 300 CAPSULE ORAL at 03:09

## 2022-09-06 RX ADMIN — SODIUM CHLORIDE: 9 INJECTION, SOLUTION INTRAVENOUS at 01:09

## 2022-09-06 RX ADMIN — CEFTRIAXONE SODIUM 1 G: 1 INJECTION, POWDER, FOR SOLUTION INTRAMUSCULAR; INTRAVENOUS at 01:09

## 2022-09-06 RX ADMIN — ATORVASTATIN CALCIUM 80 MG: 40 TABLET, FILM COATED ORAL at 08:09

## 2022-09-06 RX ADMIN — METFORMIN HYDROCHLORIDE 1000 MG: 500 TABLET, FILM COATED ORAL at 11:09

## 2022-09-06 RX ADMIN — INSULIN ASPART 6 UNITS: 100 INJECTION, SOLUTION INTRAVENOUS; SUBCUTANEOUS at 08:09

## 2022-09-06 RX ADMIN — INSULIN ASPART 10 UNITS: 100 INJECTION, SOLUTION INTRAVENOUS; SUBCUTANEOUS at 04:09

## 2022-09-06 RX ADMIN — METHADONE HYDROCHLORIDE 70 MG: 10 TABLET ORAL at 06:09

## 2022-09-06 RX ADMIN — ASPIRIN 81 MG: 81 TABLET, COATED ORAL at 06:09

## 2022-09-06 RX ADMIN — GABAPENTIN 400 MG: 300 CAPSULE ORAL at 08:09

## 2022-09-06 RX ADMIN — FERROUS SULFATE TAB 325 MG (65 MG ELEMENTAL FE) 1 EACH: 325 (65 FE) TAB at 04:09

## 2022-09-06 RX ADMIN — INSULIN ASPART 4 UNITS: 100 INJECTION, SOLUTION INTRAVENOUS; SUBCUTANEOUS at 09:09

## 2022-09-06 RX ADMIN — FOLIC ACID 1000 MCG: 1 TABLET ORAL at 08:09

## 2022-09-06 RX ADMIN — FERROUS SULFATE TAB 325 MG (65 MG ELEMENTAL FE) 1 EACH: 325 (65 FE) TAB at 08:09

## 2022-09-06 RX ADMIN — GLIPIZIDE 5 MG: 5 TABLET ORAL at 11:09

## 2022-09-06 RX ADMIN — INSULIN DETEMIR 20 UNITS: 100 INJECTION, SOLUTION SUBCUTANEOUS at 08:09

## 2022-09-06 RX ADMIN — PANTOPRAZOLE SODIUM 40 MG: 40 TABLET, DELAYED RELEASE ORAL at 06:09
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
## 2022-09-06 NOTE — PROGRESS NOTES
Patient's insurance ([facility]) has assigned an RNCM to assist with dc planning if needed: Marni: [phone] M-F 8am-4:30pm [place].

## 2022-09-07 LAB
ANION GAP SERPL CALC-SCNC: 9 MEQ/L
BASOPHILS # BLD AUTO: 0.04 X10(3)/MCL (ref 0–0.2)
BASOPHILS NFR BLD AUTO: 0.8 %
BUN SERPL-MCNC: 39.7 MG/DL (ref 8.4–25.7)
CALCIUM SERPL-MCNC: 8.7 MG/DL (ref 8.8–10)
CHLORIDE SERPL-SCNC: 103 MMOL/L (ref 98–107)
CO2 SERPL-SCNC: 22 MMOL/L (ref 23–31)
CREAT SERPL-MCNC: 2.31 MG/DL (ref 0.73–1.18)
CREAT/UREA NIT SERPL: 17
EOSINOPHIL # BLD AUTO: 0.35 X10(3)/MCL (ref 0–0.9)
EOSINOPHIL NFR BLD AUTO: 6.8 %
ERYTHROCYTE [DISTWIDTH] IN BLOOD BY AUTOMATED COUNT: 16.7 % (ref 11.5–17)
GFR SERPLBLD CREATININE-BSD FMLA CKD-EPI: 31 MLS/MIN/1.73/M2
GLUCOSE SERPL-MCNC: 431 MG/DL (ref 82–115)
HCT VFR BLD AUTO: 35.4 % (ref 42–52)
HGB BLD-MCNC: 10.6 GM/DL (ref 14–18)
IMM GRANULOCYTES # BLD AUTO: 0.04 X10(3)/MCL (ref 0–0.04)
IMM GRANULOCYTES NFR BLD AUTO: 0.8 %
LYMPHOCYTES # BLD AUTO: 1.7 X10(3)/MCL (ref 0.6–4.6)
LYMPHOCYTES NFR BLD AUTO: 32.9 %
MAGNESIUM SERPL-MCNC: 2.3 MG/DL (ref 1.6–2.6)
MCH RBC QN AUTO: 26.7 PG (ref 27–31)
MCHC RBC AUTO-ENTMCNC: 29.9 MG/DL (ref 33–36)
MCV RBC AUTO: 89.2 FL (ref 80–94)
MONOCYTES # BLD AUTO: 0.41 X10(3)/MCL (ref 0.1–1.3)
MONOCYTES NFR BLD AUTO: 7.9 %
NEUTROPHILS # BLD AUTO: 2.6 X10(3)/MCL (ref 2.1–9.2)
NEUTROPHILS NFR BLD AUTO: 50.8 %
NRBC BLD AUTO-RTO: 0 %
PLATELET # BLD AUTO: 255 X10(3)/MCL (ref 130–400)
PMV BLD AUTO: 9.6 FL (ref 7.4–10.4)
POCT GLUCOSE: 196 MG/DL (ref 70–110)
POCT GLUCOSE: 233 MG/DL (ref 70–110)
POCT GLUCOSE: 292 MG/DL (ref 70–110)
POCT GLUCOSE: 296 MG/DL (ref 70–110)
POCT GLUCOSE: 311 MG/DL (ref 70–110)
POCT GLUCOSE: 445 MG/DL (ref 70–110)
POTASSIUM SERPL-SCNC: 4.8 MMOL/L (ref 3.5–5.1)
RBC # BLD AUTO: 3.97 X10(6)/MCL (ref 4.7–6.1)
SODIUM SERPL-SCNC: 134 MMOL/L (ref 136–145)
WBC # SPEC AUTO: 5.2 X10(3)/MCL (ref 4.5–11.5)

## 2022-09-07 PROCEDURE — 25000003 PHARM REV CODE 250: Performed by: INTERNAL MEDICINE

## 2022-09-07 PROCEDURE — 63600175 PHARM REV CODE 636 W HCPCS: Performed by: NURSE PRACTITIONER

## 2022-09-07 PROCEDURE — 80048 BASIC METABOLIC PNL TOTAL CA: CPT | Performed by: INTERNAL MEDICINE

## 2022-09-07 PROCEDURE — 36415 COLL VENOUS BLD VENIPUNCTURE: CPT | Performed by: INTERNAL MEDICINE

## 2022-09-07 PROCEDURE — 83735 ASSAY OF MAGNESIUM: CPT | Performed by: INTERNAL MEDICINE

## 2022-09-07 PROCEDURE — 25000003 PHARM REV CODE 250: Performed by: NURSE PRACTITIONER

## 2022-09-07 PROCEDURE — 11000001 HC ACUTE MED/SURG PRIVATE ROOM

## 2022-09-07 PROCEDURE — 85025 COMPLETE CBC W/AUTO DIFF WBC: CPT | Performed by: INTERNAL MEDICINE

## 2022-09-07 PROCEDURE — 63600175 PHARM REV CODE 636 W HCPCS: Performed by: INTERNAL MEDICINE

## 2022-09-07 RX ORDER — TRAZODONE HYDROCHLORIDE 50 MG/1
50 TABLET ORAL NIGHTLY
Status: DISCONTINUED | OUTPATIENT
Start: 2022-09-07 | End: 2022-09-13 | Stop reason: HOSPADM

## 2022-09-07 RX ADMIN — PANTOPRAZOLE SODIUM 40 MG: 40 TABLET, DELAYED RELEASE ORAL at 08:09

## 2022-09-07 RX ADMIN — ASPIRIN 81 MG: 81 TABLET, COATED ORAL at 04:09

## 2022-09-07 RX ADMIN — INSULIN ASPART 6 UNITS: 100 INJECTION, SOLUTION INTRAVENOUS; SUBCUTANEOUS at 05:09

## 2022-09-07 RX ADMIN — INSULIN DETEMIR 20 UNITS: 100 INJECTION, SOLUTION SUBCUTANEOUS at 08:09

## 2022-09-07 RX ADMIN — METFORMIN HYDROCHLORIDE 1000 MG: 500 TABLET, FILM COATED ORAL at 05:09

## 2022-09-07 RX ADMIN — METFORMIN HYDROCHLORIDE 1000 MG: 500 TABLET, FILM COATED ORAL at 08:09

## 2022-09-07 RX ADMIN — FERROUS SULFATE TAB 325 MG (65 MG ELEMENTAL FE) 1 EACH: 325 (65 FE) TAB at 05:09

## 2022-09-07 RX ADMIN — GABAPENTIN 400 MG: 300 CAPSULE ORAL at 05:09

## 2022-09-07 RX ADMIN — METHADONE HYDROCHLORIDE 70 MG: 10 TABLET ORAL at 04:09

## 2022-09-07 RX ADMIN — INSULIN ASPART 1 UNITS: 100 INJECTION, SOLUTION INTRAVENOUS; SUBCUTANEOUS at 07:09

## 2022-09-07 RX ADMIN — ATORVASTATIN CALCIUM 80 MG: 40 TABLET, FILM COATED ORAL at 08:09

## 2022-09-07 RX ADMIN — GLIPIZIDE 5 MG: 5 TABLET ORAL at 05:09

## 2022-09-07 RX ADMIN — FERROUS SULFATE TAB 325 MG (65 MG ELEMENTAL FE) 1 EACH: 325 (65 FE) TAB at 08:09

## 2022-09-07 RX ADMIN — FOLIC ACID 1000 MCG: 1 TABLET ORAL at 08:09

## 2022-09-07 RX ADMIN — INSULIN ASPART 4 UNITS: 100 INJECTION, SOLUTION INTRAVENOUS; SUBCUTANEOUS at 02:09

## 2022-09-07 RX ADMIN — CEFTRIAXONE SODIUM 1 G: 1 INJECTION, POWDER, FOR SOLUTION INTRAMUSCULAR; INTRAVENOUS at 02:09

## 2022-09-07 RX ADMIN — DIVALPROEX SODIUM 250 MG: 250 TABLET, FILM COATED, EXTENDED RELEASE ORAL at 08:09

## 2022-09-07 RX ADMIN — INSULIN ASPART 6 UNITS: 100 INJECTION, SOLUTION INTRAVENOUS; SUBCUTANEOUS at 08:09

## 2022-09-07 RX ADMIN — GABAPENTIN 400 MG: 300 CAPSULE ORAL at 08:09

## 2022-09-07 RX ADMIN — FERROUS SULFATE TAB 325 MG (65 MG ELEMENTAL FE) 1 EACH: 325 (65 FE) TAB at 02:09

## 2022-09-07 RX ADMIN — TRAZODONE HYDROCHLORIDE 50 MG: 50 TABLET ORAL at 08:09

## 2022-09-07 RX ADMIN — GLIPIZIDE 5 MG: 5 TABLET ORAL at 08:09

## 2022-09-07 RX ADMIN — PAROXETINE HYDROCHLORIDE 20 MG: 20 TABLET, FILM COATED ORAL at 08:09

## 2022-09-07 RX ADMIN — INSULIN ASPART 10 UNITS: 100 INJECTION, SOLUTION INTRAVENOUS; SUBCUTANEOUS at 04:09
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
## 2022-09-08 ENCOUNTER — TELEPHONE (OUTPATIENT)
Dept: NEUROLOGY | Facility: CLINIC | Age: 62
End: 2022-09-08
Payer: COMMERCIAL

## 2022-09-08 LAB
ALBUMIN SERPL-MCNC: 3.4 GM/DL (ref 3.4–4.8)
ALP SERPL-CCNC: 153 UNIT/L (ref 40–150)
ALT SERPL-CCNC: 12 UNIT/L (ref 0–55)
ANION GAP SERPL CALC-SCNC: 7 MEQ/L
ANION GAP SERPL CALC-SCNC: 8 MEQ/L
AST SERPL-CCNC: 13 UNIT/L (ref 5–34)
BILIRUBIN DIRECT+TOT PNL SERPL-MCNC: 0.1 MG/DL (ref 0–0.5)
BILIRUBIN DIRECT+TOT PNL SERPL-MCNC: 0.1 MG/DL (ref 0–0.8)
BILIRUBIN DIRECT+TOT PNL SERPL-MCNC: 0.2 MG/DL
BUN SERPL-MCNC: 42.8 MG/DL (ref 8.4–25.7)
BUN SERPL-MCNC: 43.3 MG/DL (ref 8.4–25.7)
CALCIUM SERPL-MCNC: 9.1 MG/DL (ref 8.8–10)
CALCIUM SERPL-MCNC: 9.1 MG/DL (ref 8.8–10)
CHLORIDE SERPL-SCNC: 104 MMOL/L (ref 98–107)
CHLORIDE SERPL-SCNC: 104 MMOL/L (ref 98–107)
CO2 SERPL-SCNC: 21 MMOL/L (ref 23–31)
CO2 SERPL-SCNC: 23 MMOL/L (ref 23–31)
CREAT SERPL-MCNC: 1.59 MG/DL (ref 0.73–1.18)
CREAT SERPL-MCNC: 1.64 MG/DL (ref 0.73–1.18)
CREAT/UREA NIT SERPL: 26
CREAT/UREA NIT SERPL: 27
GFR SERPLBLD CREATININE-BSD FMLA CKD-EPI: 47 MLS/MIN/1.73/M2
GFR SERPLBLD CREATININE-BSD FMLA CKD-EPI: 49 MLS/MIN/1.73/M2
GLUCOSE SERPL-MCNC: 391 MG/DL (ref 82–115)
GLUCOSE SERPL-MCNC: 395 MG/DL (ref 82–115)
INR BLD: 2.56 (ref 0–1.3)
PATH REV: NORMAL
POCT GLUCOSE: 187 MG/DL (ref 70–110)
POCT GLUCOSE: 198 MG/DL (ref 70–110)
POCT GLUCOSE: 341 MG/DL (ref 70–110)
POCT GLUCOSE: 347 MG/DL (ref 70–110)
POTASSIUM SERPL-SCNC: 5.1 MMOL/L (ref 3.5–5.1)
POTASSIUM SERPL-SCNC: 5.1 MMOL/L (ref 3.5–5.1)
PROT SERPL-MCNC: 7.6 GM/DL (ref 5.8–7.6)
PROTHROMBIN TIME: 27 SECONDS (ref 12.5–14.5)
SODIUM SERPL-SCNC: 133 MMOL/L (ref 136–145)
SODIUM SERPL-SCNC: 134 MMOL/L (ref 136–145)
VALPROATE SERPL-MCNC: 18.7 UG/ML (ref 50–100)

## 2022-09-08 PROCEDURE — 80053 COMPREHEN METABOLIC PANEL: CPT | Performed by: INTERNAL MEDICINE

## 2022-09-08 PROCEDURE — S4991 NICOTINE PATCH NONLEGEND: HCPCS | Performed by: STUDENT IN AN ORGANIZED HEALTH CARE EDUCATION/TRAINING PROGRAM

## 2022-09-08 PROCEDURE — 25000003 PHARM REV CODE 250: Performed by: NURSE PRACTITIONER

## 2022-09-08 PROCEDURE — 25000003 PHARM REV CODE 250: Performed by: STUDENT IN AN ORGANIZED HEALTH CARE EDUCATION/TRAINING PROGRAM

## 2022-09-08 PROCEDURE — 36415 COLL VENOUS BLD VENIPUNCTURE: CPT | Performed by: INTERNAL MEDICINE

## 2022-09-08 PROCEDURE — 25000003 PHARM REV CODE 250: Performed by: INTERNAL MEDICINE

## 2022-09-08 PROCEDURE — 80164 ASSAY DIPROPYLACETIC ACD TOT: CPT | Performed by: INTERNAL MEDICINE

## 2022-09-08 PROCEDURE — 63600175 PHARM REV CODE 636 W HCPCS: Performed by: INTERNAL MEDICINE

## 2022-09-08 PROCEDURE — 63600175 PHARM REV CODE 636 W HCPCS: Performed by: NURSE PRACTITIONER

## 2022-09-08 PROCEDURE — 82248 BILIRUBIN DIRECT: CPT | Performed by: INTERNAL MEDICINE

## 2022-09-08 PROCEDURE — 85610 PROTHROMBIN TIME: CPT | Performed by: INTERNAL MEDICINE

## 2022-09-08 PROCEDURE — 11000001 HC ACUTE MED/SURG PRIVATE ROOM

## 2022-09-08 RX ORDER — DIVALPROEX SODIUM 500 MG/1
500 TABLET, FILM COATED, EXTENDED RELEASE ORAL NIGHTLY
Status: DISCONTINUED | OUTPATIENT
Start: 2022-09-08 | End: 2022-09-12

## 2022-09-08 RX ORDER — IBUPROFEN 200 MG
1 TABLET ORAL DAILY
Status: DISCONTINUED | OUTPATIENT
Start: 2022-09-08 | End: 2022-09-13 | Stop reason: HOSPADM

## 2022-09-08 RX ADMIN — INSULIN DETEMIR 20 UNITS: 100 INJECTION, SOLUTION SUBCUTANEOUS at 08:09

## 2022-09-08 RX ADMIN — ATORVASTATIN CALCIUM 80 MG: 40 TABLET, FILM COATED ORAL at 08:09

## 2022-09-08 RX ADMIN — GABAPENTIN 400 MG: 300 CAPSULE ORAL at 08:09

## 2022-09-08 RX ADMIN — INSULIN ASPART 4 UNITS: 100 INJECTION, SOLUTION INTRAVENOUS; SUBCUTANEOUS at 08:09

## 2022-09-08 RX ADMIN — INSULIN ASPART 2 UNITS: 100 INJECTION, SOLUTION INTRAVENOUS; SUBCUTANEOUS at 05:09

## 2022-09-08 RX ADMIN — GLIPIZIDE 5 MG: 5 TABLET ORAL at 05:09

## 2022-09-08 RX ADMIN — PANTOPRAZOLE SODIUM 40 MG: 40 TABLET, DELAYED RELEASE ORAL at 08:09

## 2022-09-08 RX ADMIN — FERROUS SULFATE TAB 325 MG (65 MG ELEMENTAL FE) 1 EACH: 325 (65 FE) TAB at 05:09

## 2022-09-08 RX ADMIN — TRAZODONE HYDROCHLORIDE 50 MG: 50 TABLET ORAL at 08:09

## 2022-09-08 RX ADMIN — SODIUM CHLORIDE: 9 INJECTION, SOLUTION INTRAVENOUS at 08:09

## 2022-09-08 RX ADMIN — INSULIN ASPART 2 UNITS: 100 INJECTION, SOLUTION INTRAVENOUS; SUBCUTANEOUS at 03:09

## 2022-09-08 RX ADMIN — FERROUS SULFATE TAB 325 MG (65 MG ELEMENTAL FE) 1 EACH: 325 (65 FE) TAB at 11:09

## 2022-09-08 RX ADMIN — ASPIRIN 81 MG: 81 TABLET, COATED ORAL at 05:09

## 2022-09-08 RX ADMIN — GABAPENTIN 400 MG: 300 CAPSULE ORAL at 02:09

## 2022-09-08 RX ADMIN — PAROXETINE HYDROCHLORIDE 20 MG: 20 TABLET, FILM COATED ORAL at 08:09

## 2022-09-08 RX ADMIN — NICOTINE 1 PATCH: 21 PATCH, EXTENDED RELEASE TRANSDERMAL at 01:09

## 2022-09-08 RX ADMIN — METHADONE HYDROCHLORIDE 70 MG: 10 TABLET ORAL at 05:09

## 2022-09-08 RX ADMIN — DIVALPROEX SODIUM 500 MG: 500 TABLET, FILM COATED, EXTENDED RELEASE ORAL at 08:09

## 2022-09-08 RX ADMIN — INSULIN ASPART 8 UNITS: 100 INJECTION, SOLUTION INTRAVENOUS; SUBCUTANEOUS at 11:09

## 2022-09-08 RX ADMIN — GLIPIZIDE 5 MG: 5 TABLET ORAL at 08:09

## 2022-09-08 RX ADMIN — FERROUS SULFATE TAB 325 MG (65 MG ELEMENTAL FE) 1 EACH: 325 (65 FE) TAB at 08:09

## 2022-09-08 RX ADMIN — FOLIC ACID 1000 MCG: 1 TABLET ORAL at 08:09

## 2022-09-08 RX ADMIN — METFORMIN HYDROCHLORIDE 1000 MG: 500 TABLET, FILM COATED ORAL at 08:09

## 2022-09-08 RX ADMIN — CEFTRIAXONE SODIUM 1 G: 1 INJECTION, POWDER, FOR SOLUTION INTRAMUSCULAR; INTRAVENOUS at 01:09

## 2022-09-08 NOTE — PSYCH
Attempted visit with Mr. Stroud this morning.  He was not in his room.  I spoke to his nurse Lul and he said that he just leaves the room and he is unsure where he is.

## 2022-09-08 NOTE — TELEPHONE ENCOUNTER
Pt LVM asking if hospital had been in contact with clinic. States they have no idea of his connection with Dr. Rivera and his sleep study. Wanted to inform he went to ER and was admitted and is now in room # 943.     Phone [phone]

## 2022-09-08 NOTE — PSYCH
Attempted visit with Mr. Stroud again but once again he was not in his room.  I spoke with his nurse who said that he received his morning meds and said that he was going take his walk.  Depakote level noted to be 18.7.  ALT and AST are normal.  Will increase Depakote  mg PO Q HS.

## 2022-09-08 NOTE — TELEPHONE ENCOUNTER
----- Message from Oneyda Marie sent at 2022  9:05 AM CDT -----  CallType: Patient Call  To: Office   From: Christian Stroud   Phone: [phone]   Patient name: Same   : 1960   Reg Dr: Dr Addison Rivera   Ref: He's in the ER at [facility] and  wanted to make you aware   [facility] ID: [id number]     --------------------------------------  Message History  Account: [id number]  Taken:  Thu 08-Sep-2022  7:33a BRW  Serial#: [id number]
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
## 2022-09-09 LAB
ALBUMIN SERPL-MCNC: 3.3 GM/DL (ref 3.4–4.8)
ALBUMIN/GLOB SERPL: 0.8 RATIO (ref 1.1–2)
ALP SERPL-CCNC: 151 UNIT/L (ref 40–150)
ALT SERPL-CCNC: 12 UNIT/L (ref 0–55)
AST SERPL-CCNC: 12 UNIT/L (ref 5–34)
BASOPHILS # BLD AUTO: 0.03 X10(3)/MCL (ref 0–0.2)
BASOPHILS NFR BLD AUTO: 0.6 %
BILIRUBIN DIRECT+TOT PNL SERPL-MCNC: 0.3 MG/DL
BUN SERPL-MCNC: 38.1 MG/DL (ref 8.4–25.7)
CALCIUM SERPL-MCNC: 9.3 MG/DL (ref 8.8–10)
CHLORIDE SERPL-SCNC: 103 MMOL/L (ref 98–107)
CO2 SERPL-SCNC: 25 MMOL/L (ref 23–31)
CREAT SERPL-MCNC: 1.44 MG/DL (ref 0.73–1.18)
EOSINOPHIL # BLD AUTO: 0.31 X10(3)/MCL (ref 0–0.9)
EOSINOPHIL NFR BLD AUTO: 5.8 %
ERYTHROCYTE [DISTWIDTH] IN BLOOD BY AUTOMATED COUNT: 16.7 % (ref 11.5–17)
GFR SERPLBLD CREATININE-BSD FMLA CKD-EPI: 55 MLS/MIN/1.73/M2
GLOBULIN SER-MCNC: 4 GM/DL (ref 2.4–3.5)
GLUCOSE SERPL-MCNC: 343 MG/DL (ref 82–115)
GLUCOSE SERPL-MCNC: 356 MG/DL (ref 70–110)
HCT VFR BLD AUTO: 35.1 % (ref 42–52)
HGB BLD-MCNC: 10.5 GM/DL (ref 14–18)
IMM GRANULOCYTES # BLD AUTO: 0.04 X10(3)/MCL (ref 0–0.04)
IMM GRANULOCYTES NFR BLD AUTO: 0.7 %
LYMPHOCYTES # BLD AUTO: 1.62 X10(3)/MCL (ref 0.6–4.6)
LYMPHOCYTES NFR BLD AUTO: 30.3 %
MCH RBC QN AUTO: 26.6 PG (ref 27–31)
MCHC RBC AUTO-ENTMCNC: 29.9 MG/DL (ref 33–36)
MCV RBC AUTO: 88.9 FL (ref 80–94)
MONOCYTES # BLD AUTO: 0.52 X10(3)/MCL (ref 0.1–1.3)
MONOCYTES NFR BLD AUTO: 9.7 %
NEUTROPHILS # BLD AUTO: 2.8 X10(3)/MCL (ref 2.1–9.2)
NEUTROPHILS NFR BLD AUTO: 52.9 %
NRBC BLD AUTO-RTO: 0 %
PLATELET # BLD AUTO: 241 X10(3)/MCL (ref 130–400)
PMV BLD AUTO: 9.8 FL (ref 7.4–10.4)
POCT GLUCOSE: 256 MG/DL (ref 70–110)
POCT GLUCOSE: 345 MG/DL (ref 70–110)
POCT GLUCOSE: 356 MG/DL (ref 70–110)
POCT GLUCOSE: 374 MG/DL (ref 70–110)
POTASSIUM SERPL-SCNC: 4.2 MMOL/L (ref 3.5–5.1)
PROT SERPL-MCNC: 7.3 GM/DL (ref 5.8–7.6)
RBC # BLD AUTO: 3.95 X10(6)/MCL (ref 4.7–6.1)
SODIUM SERPL-SCNC: 136 MMOL/L (ref 136–145)
WBC # SPEC AUTO: 5.4 X10(3)/MCL (ref 4.5–11.5)

## 2022-09-09 PROCEDURE — 36415 COLL VENOUS BLD VENIPUNCTURE: CPT | Performed by: STUDENT IN AN ORGANIZED HEALTH CARE EDUCATION/TRAINING PROGRAM

## 2022-09-09 PROCEDURE — 25000003 PHARM REV CODE 250: Performed by: NURSE PRACTITIONER

## 2022-09-09 PROCEDURE — 80053 COMPREHEN METABOLIC PANEL: CPT | Performed by: STUDENT IN AN ORGANIZED HEALTH CARE EDUCATION/TRAINING PROGRAM

## 2022-09-09 PROCEDURE — 11000001 HC ACUTE MED/SURG PRIVATE ROOM

## 2022-09-09 PROCEDURE — 63600175 PHARM REV CODE 636 W HCPCS: Performed by: INTERNAL MEDICINE

## 2022-09-09 PROCEDURE — 25000003 PHARM REV CODE 250: Performed by: INTERNAL MEDICINE

## 2022-09-09 PROCEDURE — 63600175 PHARM REV CODE 636 W HCPCS: Performed by: STUDENT IN AN ORGANIZED HEALTH CARE EDUCATION/TRAINING PROGRAM

## 2022-09-09 PROCEDURE — S4991 NICOTINE PATCH NONLEGEND: HCPCS | Performed by: STUDENT IN AN ORGANIZED HEALTH CARE EDUCATION/TRAINING PROGRAM

## 2022-09-09 PROCEDURE — 25000003 PHARM REV CODE 250: Performed by: STUDENT IN AN ORGANIZED HEALTH CARE EDUCATION/TRAINING PROGRAM

## 2022-09-09 PROCEDURE — 85025 COMPLETE CBC W/AUTO DIFF WBC: CPT | Performed by: STUDENT IN AN ORGANIZED HEALTH CARE EDUCATION/TRAINING PROGRAM

## 2022-09-09 PROCEDURE — 63600175 PHARM REV CODE 636 W HCPCS: Performed by: NURSE PRACTITIONER

## 2022-09-09 RX ORDER — WARFARIN SODIUM 5 MG/1
5 TABLET ORAL DAILY
Status: DISCONTINUED | OUTPATIENT
Start: 2022-09-09 | End: 2022-09-13

## 2022-09-09 RX ORDER — INSULIN ASPART 100 [IU]/ML
1-15 INJECTION, SOLUTION INTRAVENOUS; SUBCUTANEOUS
Status: DISCONTINUED | OUTPATIENT
Start: 2022-09-09 | End: 2022-09-13 | Stop reason: HOSPADM

## 2022-09-09 RX ORDER — HYDROXYZINE HYDROCHLORIDE 25 MG/1
25 TABLET, FILM COATED ORAL 3 TIMES DAILY
Status: DISCONTINUED | OUTPATIENT
Start: 2022-09-09 | End: 2022-09-13 | Stop reason: HOSPADM

## 2022-09-09 RX ADMIN — FOLIC ACID 1000 MCG: 1 TABLET ORAL at 08:09

## 2022-09-09 RX ADMIN — FERROUS SULFATE TAB 325 MG (65 MG ELEMENTAL FE) 1 EACH: 325 (65 FE) TAB at 05:09

## 2022-09-09 RX ADMIN — ASPIRIN 81 MG: 81 TABLET, COATED ORAL at 04:09

## 2022-09-09 RX ADMIN — HYDROXYZINE HYDROCHLORIDE 25 MG: 25 TABLET ORAL at 03:09

## 2022-09-09 RX ADMIN — WARFARIN SODIUM 5 MG: 5 TABLET ORAL at 05:09

## 2022-09-09 RX ADMIN — NICOTINE 1 PATCH: 21 PATCH, EXTENDED RELEASE TRANSDERMAL at 08:09

## 2022-09-09 RX ADMIN — PAROXETINE HYDROCHLORIDE 20 MG: 20 TABLET, FILM COATED ORAL at 08:09

## 2022-09-09 RX ADMIN — CEFTRIAXONE SODIUM 1 G: 1 INJECTION, POWDER, FOR SOLUTION INTRAMUSCULAR; INTRAVENOUS at 01:09

## 2022-09-09 RX ADMIN — GLIPIZIDE 5 MG: 5 TABLET ORAL at 08:09

## 2022-09-09 RX ADMIN — INSULIN ASPART 4 UNITS: 100 INJECTION, SOLUTION INTRAVENOUS; SUBCUTANEOUS at 10:09

## 2022-09-09 RX ADMIN — INSULIN DETEMIR 30 UNITS: 100 INJECTION, SOLUTION SUBCUTANEOUS at 10:09

## 2022-09-09 RX ADMIN — GLIPIZIDE 5 MG: 5 TABLET ORAL at 05:09

## 2022-09-09 RX ADMIN — INSULIN ASPART 8 UNITS: 100 INJECTION, SOLUTION INTRAVENOUS; SUBCUTANEOUS at 05:09

## 2022-09-09 RX ADMIN — PANTOPRAZOLE SODIUM 40 MG: 40 TABLET, DELAYED RELEASE ORAL at 08:09

## 2022-09-09 RX ADMIN — METHADONE HYDROCHLORIDE 70 MG: 10 TABLET ORAL at 04:09

## 2022-09-09 RX ADMIN — GABAPENTIN 400 MG: 300 CAPSULE ORAL at 08:09

## 2022-09-09 RX ADMIN — GABAPENTIN 400 MG: 300 CAPSULE ORAL at 10:09

## 2022-09-09 RX ADMIN — HYDROXYZINE HYDROCHLORIDE 25 MG: 25 TABLET ORAL at 10:09

## 2022-09-09 RX ADMIN — INSULIN DETEMIR 20 UNITS: 100 INJECTION, SOLUTION SUBCUTANEOUS at 08:09

## 2022-09-09 RX ADMIN — FERROUS SULFATE TAB 325 MG (65 MG ELEMENTAL FE) 1 EACH: 325 (65 FE) TAB at 08:09

## 2022-09-09 RX ADMIN — TRAZODONE HYDROCHLORIDE 50 MG: 50 TABLET ORAL at 10:09

## 2022-09-09 RX ADMIN — ATORVASTATIN CALCIUM 80 MG: 40 TABLET, FILM COATED ORAL at 10:09

## 2022-09-09 RX ADMIN — DIVALPROEX SODIUM 500 MG: 500 TABLET, FILM COATED, EXTENDED RELEASE ORAL at 10:09

## 2022-09-09 RX ADMIN — GABAPENTIN 400 MG: 300 CAPSULE ORAL at 01:09

## 2022-09-09 RX ADMIN — INSULIN ASPART 15 UNITS: 100 INJECTION, SOLUTION INTRAVENOUS; SUBCUTANEOUS at 12:09

## 2022-09-09 RX ADMIN — FERROUS SULFATE TAB 325 MG (65 MG ELEMENTAL FE) 1 EACH: 325 (65 FE) TAB at 12:09

## 2022-09-09 RX ADMIN — INSULIN ASPART 10 UNITS: 100 INJECTION, SOLUTION INTRAVENOUS; SUBCUTANEOUS at 04:09
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
## 2022-09-09 NOTE — PROGRESS NOTES
"Inpatient Nutrition Evaluation    Admit Date: 9/4/2022   Total duration of encounter: 5 days    Nutrition Recommendation/Prescription     Continue heart healthy, diabetic, coumadin diet as tolerated  RD to monitor po intake and weight changes    Nutrition Assessment     Chart Review    Reason Seen: length of stay    Diagnosis: Acute manic episode   Bipolar disorder  Severe hyperglycemia   ROXANN, severe   Right lower extremity wounds, infected with surrounding cellulitis  Generalized body itching   Bizarre behavior - Psychosis vs Dementia  Uncontrolled diabetes mellitus  Supratherapeutic INR   Mechanical AVR  on Coumadin  Hypertension, controlled  insomnia    Relevant Medical History: narcolepsy    Nutrition-Related Medications: NaCl PRN, ferrous sulfate TID, folic acid daily, insulin BID, protonix, coumadin daily    Nutrition-Related Labs: 9/9: RBC-3.95, H/H-10.5/35.1, BUN-38.1, creat-1.44, glu-343      Diet Order: Diet heart healthy Diabetic, Coumadin Meal Plan  Oral Supplement Order: none at this time  Appetite/Oral Intake: good/% of meals  Factors Affecting Nutritional Intake: none identified at this time  Food/Mormon/Cultural Preferences: none reported    Skin Integrity: wound, scab  Wound(s):       Comments    9/9: pt reports good appetite, eating 100% of meals, with no n/v/d/c. UBW reported 205 lb with weight loss since admission. Admit weight of 90.7 kg (200 lb) and previous weight of 203 lb on 7/18. Weight stable at this time.    Anthropometrics    Height: 5' 10" (177.8 cm) Height Method: Stated  Last Weight: 90.7 kg (200 lb) (09/05/22 0033) Weight Method: Standard Scale  BMI (Calculated): 28.7  BMI Classification: overweight (BMI 25-29.9)        Ideal Body Weight (IBW), Male: 166 lb  % Ideal Body Weight, Male (lb): 120.48 %                   Wt Readings from Last 5 Encounters:   09/05/22 90.7 kg (200 lb)     Weight Change(s) Since Admission:  Admit Weight: 90.7 kg (200 lb) (09/04/22 " 3260)      Patient Education    Not applicable.    Monitoring & Evaluation     Dietitian will monitor food and beverage intake and weight change.  Nutrition Risk/Follow-Up: low (follow-up in 5-7 days)  Patients assigned 'low nutrition risk' status do not qualify for a full nutritional assessment but will be monitored and re-evaluated in a 5-7 day time period.    Stephanie Orr, Registration Eligible, Provisional LDN

## 2022-09-10 LAB
ALBUMIN SERPL-MCNC: 2.9 GM/DL (ref 3.4–4.8)
ALBUMIN/GLOB SERPL: 0.9 RATIO (ref 1.1–2)
ALP SERPL-CCNC: 102 UNIT/L (ref 40–150)
ALT SERPL-CCNC: 9 UNIT/L (ref 0–55)
AST SERPL-CCNC: 11 UNIT/L (ref 5–34)
BASOPHILS # BLD AUTO: 0.01 X10(3)/MCL (ref 0–0.2)
BASOPHILS NFR BLD AUTO: 0.2 %
BILIRUBIN DIRECT+TOT PNL SERPL-MCNC: 0.2 MG/DL
BUN SERPL-MCNC: 24.7 MG/DL (ref 8.4–25.7)
CALCIUM SERPL-MCNC: 8.8 MG/DL (ref 8.8–10)
CHLORIDE SERPL-SCNC: 110 MMOL/L (ref 98–107)
CO2 SERPL-SCNC: 24 MMOL/L (ref 23–31)
CREAT SERPL-MCNC: 0.95 MG/DL (ref 0.73–1.18)
EOSINOPHIL # BLD AUTO: 0.33 X10(3)/MCL (ref 0–0.9)
EOSINOPHIL NFR BLD AUTO: 6.2 %
ERYTHROCYTE [DISTWIDTH] IN BLOOD BY AUTOMATED COUNT: 16.9 % (ref 11.5–17)
GFR SERPLBLD CREATININE-BSD FMLA CKD-EPI: >60 MLS/MIN/1.73/M2
GLOBULIN SER-MCNC: 3.4 GM/DL (ref 2.4–3.5)
GLUCOSE SERPL-MCNC: 161 MG/DL (ref 82–115)
HCT VFR BLD AUTO: 32.7 % (ref 42–52)
HGB BLD-MCNC: 9.8 GM/DL (ref 14–18)
IMM GRANULOCYTES # BLD AUTO: 0.05 X10(3)/MCL (ref 0–0.04)
IMM GRANULOCYTES NFR BLD AUTO: 0.9 %
INR BLD: 1.51 (ref 0–1.3)
LYMPHOCYTES # BLD AUTO: 1.66 X10(3)/MCL (ref 0.6–4.6)
LYMPHOCYTES NFR BLD AUTO: 31.2 %
MCH RBC QN AUTO: 26.2 PG (ref 27–31)
MCHC RBC AUTO-ENTMCNC: 30 MG/DL (ref 33–36)
MCV RBC AUTO: 87.4 FL (ref 80–94)
MONOCYTES # BLD AUTO: 0.62 X10(3)/MCL (ref 0.1–1.3)
MONOCYTES NFR BLD AUTO: 11.7 %
NEUTROPHILS # BLD AUTO: 2.7 X10(3)/MCL (ref 2.1–9.2)
NEUTROPHILS NFR BLD AUTO: 49.8 %
NRBC BLD AUTO-RTO: 0 %
PLATELET # BLD AUTO: 200 X10(3)/MCL (ref 130–400)
PMV BLD AUTO: 9.7 FL (ref 7.4–10.4)
POCT GLUCOSE: 109 MG/DL (ref 70–110)
POCT GLUCOSE: 152 MG/DL (ref 70–110)
POCT GLUCOSE: 159 MG/DL (ref 70–110)
POCT GLUCOSE: 240 MG/DL (ref 70–110)
POCT GLUCOSE: 300 MG/DL (ref 70–110)
POCT GLUCOSE: 322 MG/DL (ref 70–110)
POTASSIUM SERPL-SCNC: 4.2 MMOL/L (ref 3.5–5.1)
PROT SERPL-MCNC: 6.3 GM/DL (ref 5.8–7.6)
PROTHROMBIN TIME: 18 SECONDS (ref 12.5–14.5)
RBC # BLD AUTO: 3.74 X10(6)/MCL (ref 4.7–6.1)
SODIUM SERPL-SCNC: 140 MMOL/L (ref 136–145)
WBC # SPEC AUTO: 5.3 X10(3)/MCL (ref 4.5–11.5)

## 2022-09-10 PROCEDURE — S4991 NICOTINE PATCH NONLEGEND: HCPCS | Performed by: STUDENT IN AN ORGANIZED HEALTH CARE EDUCATION/TRAINING PROGRAM

## 2022-09-10 PROCEDURE — 25000003 PHARM REV CODE 250: Performed by: NURSE PRACTITIONER

## 2022-09-10 PROCEDURE — 80053 COMPREHEN METABOLIC PANEL: CPT | Performed by: STUDENT IN AN ORGANIZED HEALTH CARE EDUCATION/TRAINING PROGRAM

## 2022-09-10 PROCEDURE — 85025 COMPLETE CBC W/AUTO DIFF WBC: CPT | Performed by: STUDENT IN AN ORGANIZED HEALTH CARE EDUCATION/TRAINING PROGRAM

## 2022-09-10 PROCEDURE — 63600175 PHARM REV CODE 636 W HCPCS: Performed by: STUDENT IN AN ORGANIZED HEALTH CARE EDUCATION/TRAINING PROGRAM

## 2022-09-10 PROCEDURE — 85610 PROTHROMBIN TIME: CPT | Performed by: STUDENT IN AN ORGANIZED HEALTH CARE EDUCATION/TRAINING PROGRAM

## 2022-09-10 PROCEDURE — 36415 COLL VENOUS BLD VENIPUNCTURE: CPT | Performed by: STUDENT IN AN ORGANIZED HEALTH CARE EDUCATION/TRAINING PROGRAM

## 2022-09-10 PROCEDURE — 25000003 PHARM REV CODE 250: Performed by: STUDENT IN AN ORGANIZED HEALTH CARE EDUCATION/TRAINING PROGRAM

## 2022-09-10 PROCEDURE — 11000001 HC ACUTE MED/SURG PRIVATE ROOM

## 2022-09-10 PROCEDURE — 25000003 PHARM REV CODE 250: Performed by: INTERNAL MEDICINE

## 2022-09-10 RX ORDER — TRAZODONE HYDROCHLORIDE 50 MG/1
50 TABLET ORAL NIGHTLY PRN
Status: DISCONTINUED | OUTPATIENT
Start: 2022-09-10 | End: 2022-09-12

## 2022-09-10 RX ORDER — DOXYCYCLINE HYCLATE 100 MG
100 TABLET ORAL EVERY 12 HOURS
Status: DISCONTINUED | OUTPATIENT
Start: 2022-09-10 | End: 2022-09-13 | Stop reason: HOSPADM

## 2022-09-10 RX ORDER — AMOXICILLIN AND CLAVULANATE POTASSIUM 875; 125 MG/1; MG/1
1 TABLET, FILM COATED ORAL EVERY 12 HOURS
Status: DISCONTINUED | OUTPATIENT
Start: 2022-09-10 | End: 2022-09-13 | Stop reason: HOSPADM

## 2022-09-10 RX ORDER — WARFARIN SODIUM 5 MG/1
5 TABLET ORAL ONCE
Status: COMPLETED | OUTPATIENT
Start: 2022-09-10 | End: 2022-09-10

## 2022-09-10 RX ADMIN — DIVALPROEX SODIUM 500 MG: 500 TABLET, FILM COATED, EXTENDED RELEASE ORAL at 09:09

## 2022-09-10 RX ADMIN — INSULIN DETEMIR 30 UNITS: 100 INJECTION, SOLUTION SUBCUTANEOUS at 09:09

## 2022-09-10 RX ADMIN — NICOTINE 1 PATCH: 21 PATCH, EXTENDED RELEASE TRANSDERMAL at 10:09

## 2022-09-10 RX ADMIN — GLIPIZIDE 5 MG: 5 TABLET ORAL at 04:09

## 2022-09-10 RX ADMIN — WARFARIN SODIUM 5 MG: 5 TABLET ORAL at 02:09

## 2022-09-10 RX ADMIN — INSULIN ASPART 8 UNITS: 100 INJECTION, SOLUTION INTRAVENOUS; SUBCUTANEOUS at 04:09

## 2022-09-10 RX ADMIN — PANTOPRAZOLE SODIUM 40 MG: 40 TABLET, DELAYED RELEASE ORAL at 10:09

## 2022-09-10 RX ADMIN — WARFARIN SODIUM 5 MG: 5 TABLET ORAL at 04:09

## 2022-09-10 RX ADMIN — INSULIN ASPART 2 UNITS: 100 INJECTION, SOLUTION INTRAVENOUS; SUBCUTANEOUS at 02:09

## 2022-09-10 RX ADMIN — ATORVASTATIN CALCIUM 80 MG: 40 TABLET, FILM COATED ORAL at 09:09

## 2022-09-10 RX ADMIN — HYDROXYZINE HYDROCHLORIDE 25 MG: 25 TABLET ORAL at 10:09

## 2022-09-10 RX ADMIN — GABAPENTIN 400 MG: 300 CAPSULE ORAL at 09:09

## 2022-09-10 RX ADMIN — INSULIN ASPART 2 UNITS: 100 INJECTION, SOLUTION INTRAVENOUS; SUBCUTANEOUS at 11:09

## 2022-09-10 RX ADMIN — FERROUS SULFATE TAB 325 MG (65 MG ELEMENTAL FE) 1 EACH: 325 (65 FE) TAB at 11:09

## 2022-09-10 RX ADMIN — TRAZODONE HYDROCHLORIDE 50 MG: 50 TABLET ORAL at 09:09

## 2022-09-10 RX ADMIN — GABAPENTIN 400 MG: 300 CAPSULE ORAL at 10:09

## 2022-09-10 RX ADMIN — FERROUS SULFATE TAB 325 MG (65 MG ELEMENTAL FE) 1 EACH: 325 (65 FE) TAB at 04:09

## 2022-09-10 RX ADMIN — METHADONE HYDROCHLORIDE 70 MG: 10 TABLET ORAL at 05:09

## 2022-09-10 RX ADMIN — AMOXICILLIN AND CLAVULANATE POTASSIUM 1 TABLET: 875; 125 TABLET, FILM COATED ORAL at 10:09

## 2022-09-10 RX ADMIN — DOXYCYCLINE HYCLATE 100 MG: 100 TABLET, COATED ORAL at 10:09

## 2022-09-10 RX ADMIN — ASPIRIN 81 MG: 81 TABLET, COATED ORAL at 05:09

## 2022-09-10 RX ADMIN — AMOXICILLIN AND CLAVULANATE POTASSIUM 1 TABLET: 875; 125 TABLET, FILM COATED ORAL at 09:09

## 2022-09-10 RX ADMIN — GABAPENTIN 400 MG: 300 CAPSULE ORAL at 02:09

## 2022-09-10 RX ADMIN — FERROUS SULFATE TAB 325 MG (65 MG ELEMENTAL FE) 1 EACH: 325 (65 FE) TAB at 10:09

## 2022-09-10 RX ADMIN — INSULIN ASPART 2 UNITS: 100 INJECTION, SOLUTION INTRAVENOUS; SUBCUTANEOUS at 05:09

## 2022-09-10 RX ADMIN — FOLIC ACID 1000 MCG: 1 TABLET ORAL at 10:09

## 2022-09-10 RX ADMIN — HYDROXYZINE HYDROCHLORIDE 25 MG: 25 TABLET ORAL at 02:09

## 2022-09-10 RX ADMIN — INSULIN ASPART 12 UNITS: 100 INJECTION, SOLUTION INTRAVENOUS; SUBCUTANEOUS at 09:09

## 2022-09-10 RX ADMIN — PAROXETINE HYDROCHLORIDE 20 MG: 20 TABLET, FILM COATED ORAL at 10:09

## 2022-09-10 RX ADMIN — DOXYCYCLINE HYCLATE 100 MG: 100 TABLET, COATED ORAL at 09:09

## 2022-09-10 RX ADMIN — HYDROXYZINE HYDROCHLORIDE 25 MG: 25 TABLET ORAL at 09:09

## 2022-09-10 RX ADMIN — GLIPIZIDE 5 MG: 5 TABLET ORAL at 10:09
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
## 2022-09-10 NOTE — PSYCH
Psychiatric Progress Note    Subjective:     Current Meds:   Scheduled Meds:    aspirin  81 mg Oral Daily    atorvastatin  80 mg Oral Nightly    cefTRIAXone (ROCEPHIN) IVPB  1 g Intravenous Q24H    divalproex ER  500 mg Oral QHS    [START ON 9/8/2022] dulaglutide  1.5 mg Subcutaneous Every Thurs    ferrous sulfate  1 tablet Oral TID WM    folic acid  1,000 mcg Oral Daily    gabapentin  400 mg Oral TID    glipiZIDE  5 mg Oral BID WM    insulin detemir U-100  20 Units Subcutaneous BID    lisinopriL  40 mg Oral Daily    metFORMIN  1,000 mg Oral BID WM    methadone  70 mg Oral Daily    pantoprazole  40 mg Oral QAM    paroxetine  20 mg Oral QAM      PRN Meds: dextrose 10%, dextrose 10%, diphenhydrAMINE, glucagon (human recombinant), glucose, glucose, haloperidol lactate, insulin aspart U-100, lorazepam, naloxone, sodium chloride 0.9%   Psychotherapeutics (From admission, onward)        Start     Stop Route Frequency Ordered     09/05/22 0700   paroxetine tablet 20 mg         -- Oral Every morning 09/04/22 2327 09/04/22 2321   haloperidol lactate injection 5 mg         -- IM Every 6 hours PRN 09/04/22 2222 09/04/22 2320   lorazepam (ATIVAN) injection 1 mg         -- IV Every 2 hours PRN 09/04/22 2222        Assessment/Plan:  Provisional dx: Bipolar d/o moderate/alyssa without psychosis    Recommendations:  Cont. Depakote ER 500mg po QHS  Pending VPA on 9/11  Trazodone 50mg po QHS PRN insomnia  Psych to f/u on 9/11

## 2022-09-11 LAB
ALBUMIN SERPL-MCNC: 2.8 GM/DL (ref 3.4–4.8)
ALBUMIN SERPL-MCNC: 3 GM/DL (ref 3.4–4.8)
ALBUMIN/GLOB SERPL: 0.8 RATIO (ref 1.1–2)
ALBUMIN/GLOB SERPL: 1 RATIO (ref 1.1–2)
ALP SERPL-CCNC: 106 UNIT/L (ref 40–150)
ALP SERPL-CCNC: 84 UNIT/L (ref 40–150)
ALT SERPL-CCNC: 15 UNIT/L (ref 0–55)
ALT SERPL-CCNC: 17 UNIT/L (ref 0–55)
AST SERPL-CCNC: 18 UNIT/L (ref 5–34)
AST SERPL-CCNC: 20 UNIT/L (ref 5–34)
BASOPHILS # BLD AUTO: 0.03 X10(3)/MCL (ref 0–0.2)
BASOPHILS # BLD AUTO: 0.04 X10(3)/MCL (ref 0–0.2)
BASOPHILS NFR BLD AUTO: 0.7 %
BASOPHILS NFR BLD AUTO: 0.9 %
BILIRUBIN DIRECT+TOT PNL SERPL-MCNC: 0.2 MG/DL
BILIRUBIN DIRECT+TOT PNL SERPL-MCNC: 0.2 MG/DL
BUN SERPL-MCNC: 17.4 MG/DL (ref 8.4–25.7)
BUN SERPL-MCNC: 19 MG/DL (ref 8.4–25.7)
CALCIUM SERPL-MCNC: 8.7 MG/DL (ref 8.8–10)
CALCIUM SERPL-MCNC: 8.9 MG/DL (ref 8.8–10)
CHLORIDE SERPL-SCNC: 106 MMOL/L (ref 98–107)
CHLORIDE SERPL-SCNC: 108 MMOL/L (ref 98–107)
CO2 SERPL-SCNC: 24 MMOL/L (ref 23–31)
CO2 SERPL-SCNC: 25 MMOL/L (ref 23–31)
CREAT SERPL-MCNC: 0.92 MG/DL (ref 0.73–1.18)
CREAT SERPL-MCNC: 1.02 MG/DL (ref 0.73–1.18)
EOSINOPHIL # BLD AUTO: 0.29 X10(3)/MCL (ref 0–0.9)
EOSINOPHIL # BLD AUTO: 0.34 X10(3)/MCL (ref 0–0.9)
EOSINOPHIL NFR BLD AUTO: 6.7 %
EOSINOPHIL NFR BLD AUTO: 7.7 %
ERYTHROCYTE [DISTWIDTH] IN BLOOD BY AUTOMATED COUNT: 16.9 % (ref 11.5–17)
ERYTHROCYTE [DISTWIDTH] IN BLOOD BY AUTOMATED COUNT: 17 % (ref 11.5–17)
GFR SERPLBLD CREATININE-BSD FMLA CKD-EPI: >60 MLS/MIN/1.73/M2
GFR SERPLBLD CREATININE-BSD FMLA CKD-EPI: >60 MLS/MIN/1.73/M2
GLOBULIN SER-MCNC: 3.1 GM/DL (ref 2.4–3.5)
GLOBULIN SER-MCNC: 3.4 GM/DL (ref 2.4–3.5)
GLUCOSE SERPL-MCNC: 220 MG/DL (ref 82–115)
GLUCOSE SERPL-MCNC: 266 MG/DL (ref 82–115)
HCT VFR BLD AUTO: 30.2 % (ref 42–52)
HCT VFR BLD AUTO: 30.6 % (ref 42–52)
HGB BLD-MCNC: 9.3 GM/DL (ref 14–18)
HGB BLD-MCNC: 9.4 GM/DL (ref 14–18)
IMM GRANULOCYTES # BLD AUTO: 0.04 X10(3)/MCL (ref 0–0.04)
IMM GRANULOCYTES # BLD AUTO: 0.05 X10(3)/MCL (ref 0–0.04)
IMM GRANULOCYTES NFR BLD AUTO: 0.9 %
IMM GRANULOCYTES NFR BLD AUTO: 1.1 %
INR BLD: 1.77 (ref 0–1.3)
LYMPHOCYTES # BLD AUTO: 1.5 X10(3)/MCL (ref 0.6–4.6)
LYMPHOCYTES # BLD AUTO: 1.63 X10(3)/MCL (ref 0.6–4.6)
LYMPHOCYTES NFR BLD AUTO: 34.4 %
LYMPHOCYTES NFR BLD AUTO: 37 %
MCH RBC QN AUTO: 26.7 PG (ref 27–31)
MCH RBC QN AUTO: 26.9 PG (ref 27–31)
MCHC RBC AUTO-ENTMCNC: 30.7 MG/DL (ref 33–36)
MCHC RBC AUTO-ENTMCNC: 30.8 MG/DL (ref 33–36)
MCV RBC AUTO: 86.8 FL (ref 80–94)
MCV RBC AUTO: 87.4 FL (ref 80–94)
MONOCYTES # BLD AUTO: 0.52 X10(3)/MCL (ref 0.1–1.3)
MONOCYTES # BLD AUTO: 0.53 X10(3)/MCL (ref 0.1–1.3)
MONOCYTES NFR BLD AUTO: 11.9 %
MONOCYTES NFR BLD AUTO: 12 %
NEUTROPHILS # BLD AUTO: 1.8 X10(3)/MCL (ref 2.1–9.2)
NEUTROPHILS # BLD AUTO: 2 X10(3)/MCL (ref 2.1–9.2)
NEUTROPHILS NFR BLD AUTO: 41.3 %
NEUTROPHILS NFR BLD AUTO: 45.4 %
NRBC BLD AUTO-RTO: 0 %
NRBC BLD AUTO-RTO: 0 %
PLATELET # BLD AUTO: 210 X10(3)/MCL (ref 130–400)
PLATELET # BLD AUTO: 220 X10(3)/MCL (ref 130–400)
PMV BLD AUTO: 9.4 FL (ref 7.4–10.4)
PMV BLD AUTO: 9.9 FL (ref 7.4–10.4)
POCT GLUCOSE: 134 MG/DL (ref 70–110)
POCT GLUCOSE: 144 MG/DL (ref 70–110)
POCT GLUCOSE: 219 MG/DL (ref 70–110)
POCT GLUCOSE: 227 MG/DL (ref 70–110)
POCT GLUCOSE: 260 MG/DL (ref 70–110)
POCT GLUCOSE: 260 MG/DL (ref 70–110)
POCT GLUCOSE: 326 MG/DL (ref 70–110)
POTASSIUM SERPL-SCNC: 4.5 MMOL/L (ref 3.5–5.1)
POTASSIUM SERPL-SCNC: 4.6 MMOL/L (ref 3.5–5.1)
PROT SERPL-MCNC: 6.1 GM/DL (ref 5.8–7.6)
PROT SERPL-MCNC: 6.2 GM/DL (ref 5.8–7.6)
PROTHROMBIN TIME: 20.3 SECONDS (ref 12.5–14.5)
RBC # BLD AUTO: 3.48 X10(6)/MCL (ref 4.7–6.1)
RBC # BLD AUTO: 3.5 X10(6)/MCL (ref 4.7–6.1)
SODIUM SERPL-SCNC: 138 MMOL/L (ref 136–145)
SODIUM SERPL-SCNC: 140 MMOL/L (ref 136–145)
WBC # SPEC AUTO: 4.4 X10(3)/MCL (ref 4.5–11.5)
WBC # SPEC AUTO: 4.4 X10(3)/MCL (ref 4.5–11.5)

## 2022-09-11 PROCEDURE — S4991 NICOTINE PATCH NONLEGEND: HCPCS | Performed by: STUDENT IN AN ORGANIZED HEALTH CARE EDUCATION/TRAINING PROGRAM

## 2022-09-11 PROCEDURE — 25000003 PHARM REV CODE 250: Performed by: NURSE PRACTITIONER

## 2022-09-11 PROCEDURE — 25000003 PHARM REV CODE 250: Performed by: STUDENT IN AN ORGANIZED HEALTH CARE EDUCATION/TRAINING PROGRAM

## 2022-09-11 PROCEDURE — 25000003 PHARM REV CODE 250: Performed by: INTERNAL MEDICINE

## 2022-09-11 PROCEDURE — 36415 COLL VENOUS BLD VENIPUNCTURE: CPT | Performed by: STUDENT IN AN ORGANIZED HEALTH CARE EDUCATION/TRAINING PROGRAM

## 2022-09-11 PROCEDURE — 63600175 PHARM REV CODE 636 W HCPCS: Performed by: STUDENT IN AN ORGANIZED HEALTH CARE EDUCATION/TRAINING PROGRAM

## 2022-09-11 PROCEDURE — 11000001 HC ACUTE MED/SURG PRIVATE ROOM

## 2022-09-11 PROCEDURE — 36600 WITHDRAWAL OF ARTERIAL BLOOD: CPT

## 2022-09-11 PROCEDURE — 93005 ELECTROCARDIOGRAM TRACING: CPT

## 2022-09-11 PROCEDURE — 85610 PROTHROMBIN TIME: CPT | Performed by: STUDENT IN AN ORGANIZED HEALTH CARE EDUCATION/TRAINING PROGRAM

## 2022-09-11 PROCEDURE — 85025 COMPLETE CBC W/AUTO DIFF WBC: CPT | Performed by: STUDENT IN AN ORGANIZED HEALTH CARE EDUCATION/TRAINING PROGRAM

## 2022-09-11 PROCEDURE — 99900035 HC TECH TIME PER 15 MIN (STAT)

## 2022-09-11 PROCEDURE — 93010 ELECTROCARDIOGRAM REPORT: CPT | Mod: ,,, | Performed by: STUDENT IN AN ORGANIZED HEALTH CARE EDUCATION/TRAINING PROGRAM

## 2022-09-11 PROCEDURE — 93010 EKG 12-LEAD: ICD-10-PCS | Mod: ,,, | Performed by: STUDENT IN AN ORGANIZED HEALTH CARE EDUCATION/TRAINING PROGRAM

## 2022-09-11 PROCEDURE — 82803 BLOOD GASES ANY COMBINATION: CPT

## 2022-09-11 PROCEDURE — 80053 COMPREHEN METABOLIC PANEL: CPT | Performed by: STUDENT IN AN ORGANIZED HEALTH CARE EDUCATION/TRAINING PROGRAM

## 2022-09-11 RX ORDER — METHADONE HYDROCHLORIDE 10 MG/1
70 TABLET ORAL DAILY
Status: DISCONTINUED | OUTPATIENT
Start: 2022-09-11 | End: 2022-09-13 | Stop reason: HOSPADM

## 2022-09-11 RX ADMIN — GABAPENTIN 400 MG: 300 CAPSULE ORAL at 03:09

## 2022-09-11 RX ADMIN — PAROXETINE HYDROCHLORIDE 20 MG: 20 TABLET, FILM COATED ORAL at 08:09

## 2022-09-11 RX ADMIN — HYDROXYZINE HYDROCHLORIDE 25 MG: 25 TABLET ORAL at 03:09

## 2022-09-11 RX ADMIN — INSULIN ASPART 12 UNITS: 100 INJECTION, SOLUTION INTRAVENOUS; SUBCUTANEOUS at 12:09

## 2022-09-11 RX ADMIN — INSULIN ASPART 4 UNITS: 100 INJECTION, SOLUTION INTRAVENOUS; SUBCUTANEOUS at 05:09

## 2022-09-11 RX ADMIN — INSULIN ASPART 8 UNITS: 100 INJECTION, SOLUTION INTRAVENOUS; SUBCUTANEOUS at 04:09

## 2022-09-11 RX ADMIN — FERROUS SULFATE TAB 325 MG (65 MG ELEMENTAL FE) 1 EACH: 325 (65 FE) TAB at 04:09

## 2022-09-11 RX ADMIN — FERROUS SULFATE TAB 325 MG (65 MG ELEMENTAL FE) 1 EACH: 325 (65 FE) TAB at 12:09

## 2022-09-11 RX ADMIN — FOLIC ACID 1000 MCG: 1 TABLET ORAL at 08:09

## 2022-09-11 RX ADMIN — AMOXICILLIN AND CLAVULANATE POTASSIUM 1 TABLET: 875; 125 TABLET, FILM COATED ORAL at 08:09

## 2022-09-11 RX ADMIN — HYDROXYZINE HYDROCHLORIDE 25 MG: 25 TABLET ORAL at 08:09

## 2022-09-11 RX ADMIN — WARFARIN SODIUM 5 MG: 5 TABLET ORAL at 04:09

## 2022-09-11 RX ADMIN — PANTOPRAZOLE SODIUM 40 MG: 40 TABLET, DELAYED RELEASE ORAL at 08:09

## 2022-09-11 RX ADMIN — ASPIRIN 81 MG: 81 TABLET, COATED ORAL at 06:09

## 2022-09-11 RX ADMIN — NICOTINE 1 PATCH: 21 PATCH, EXTENDED RELEASE TRANSDERMAL at 08:09

## 2022-09-11 RX ADMIN — GLIPIZIDE 5 MG: 5 TABLET ORAL at 08:09

## 2022-09-11 RX ADMIN — FERROUS SULFATE TAB 325 MG (65 MG ELEMENTAL FE) 1 EACH: 325 (65 FE) TAB at 08:09

## 2022-09-11 RX ADMIN — METHADONE HYDROCHLORIDE 70 MG: 10 TABLET ORAL at 02:09

## 2022-09-11 RX ADMIN — GLIPIZIDE 5 MG: 5 TABLET ORAL at 04:09

## 2022-09-11 RX ADMIN — GABAPENTIN 400 MG: 300 CAPSULE ORAL at 08:09

## 2022-09-11 RX ADMIN — DOXYCYCLINE HYCLATE 100 MG: 100 TABLET, COATED ORAL at 08:09
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
## 2022-09-12 LAB
ALBUMIN SERPL-MCNC: 2.8 GM/DL (ref 3.4–4.8)
ALBUMIN/GLOB SERPL: 0.8 RATIO (ref 1.1–2)
ALP SERPL-CCNC: 75 UNIT/L (ref 40–150)
ALT SERPL-CCNC: 19 UNIT/L (ref 0–55)
AMPHET UR QL SCN: NEGATIVE
AST SERPL-CCNC: 21 UNIT/L (ref 5–34)
BARBITURATE SCN PRESENT UR: NEGATIVE
BASOPHILS # BLD AUTO: 0.03 X10(3)/MCL (ref 0–0.2)
BASOPHILS NFR BLD AUTO: 0.6 %
BENZODIAZ UR QL SCN: NEGATIVE
BILIRUBIN DIRECT+TOT PNL SERPL-MCNC: 0.2 MG/DL
BUN SERPL-MCNC: 15.4 MG/DL (ref 8.4–25.7)
CALCIUM SERPL-MCNC: 8.7 MG/DL (ref 8.8–10)
CANNABINOIDS UR QL SCN: NEGATIVE
CHLORIDE SERPL-SCNC: 106 MMOL/L (ref 98–107)
CO2 SERPL-SCNC: 26 MMOL/L (ref 23–31)
COCAINE UR QL SCN: NEGATIVE
CREAT SERPL-MCNC: 0.88 MG/DL (ref 0.73–1.18)
EOSINOPHIL # BLD AUTO: 0.25 X10(3)/MCL (ref 0–0.9)
EOSINOPHIL NFR BLD AUTO: 5.2 %
ERYTHROCYTE [DISTWIDTH] IN BLOOD BY AUTOMATED COUNT: 17.1 % (ref 11.5–17)
FENTANYL UR QL SCN: NEGATIVE
GFR SERPLBLD CREATININE-BSD FMLA CKD-EPI: >60 MLS/MIN/1.73/M2
GLOBULIN SER-MCNC: 3.4 GM/DL (ref 2.4–3.5)
GLUCOSE SERPL-MCNC: 236 MG/DL (ref 82–115)
HCT VFR BLD AUTO: 32.9 % (ref 42–52)
HGB BLD-MCNC: 9.8 GM/DL (ref 14–18)
IMM GRANULOCYTES # BLD AUTO: 0.04 X10(3)/MCL (ref 0–0.04)
IMM GRANULOCYTES NFR BLD AUTO: 0.8 %
INR BLD: 2.48 (ref 0–1.3)
LYMPHOCYTES # BLD AUTO: 1.5 X10(3)/MCL (ref 0.6–4.6)
LYMPHOCYTES NFR BLD AUTO: 31.3 %
MCH RBC QN AUTO: 26.3 PG (ref 27–31)
MCHC RBC AUTO-ENTMCNC: 29.8 MG/DL (ref 33–36)
MCV RBC AUTO: 88.4 FL (ref 80–94)
MDMA UR QL SCN: NEGATIVE
MONOCYTES # BLD AUTO: 0.53 X10(3)/MCL (ref 0.1–1.3)
MONOCYTES NFR BLD AUTO: 11 %
NEUTROPHILS # BLD AUTO: 2.5 X10(3)/MCL (ref 2.1–9.2)
NEUTROPHILS NFR BLD AUTO: 51.1 %
NRBC BLD AUTO-RTO: 0 %
OPIATES UR QL SCN: NEGATIVE
PCP UR QL: NEGATIVE
PH UR: 6.5 [PH] (ref 3–11)
PLATELET # BLD AUTO: 215 X10(3)/MCL (ref 130–400)
PMV BLD AUTO: 9.1 FL (ref 7.4–10.4)
POCT GLUCOSE: 135 MG/DL (ref 70–110)
POCT GLUCOSE: 280 MG/DL (ref 70–110)
POCT GLUCOSE: 298 MG/DL (ref 70–110)
POCT GLUCOSE: 317 MG/DL (ref 70–110)
POCT GLUCOSE: 387 MG/DL (ref 70–110)
POTASSIUM SERPL-SCNC: 4.7 MMOL/L (ref 3.5–5.1)
PROT SERPL-MCNC: 6.2 GM/DL (ref 5.8–7.6)
PROTHROMBIN TIME: 26.4 SECONDS (ref 12.5–14.5)
RBC # BLD AUTO: 3.72 X10(6)/MCL (ref 4.7–6.1)
SARS-COV-2 RDRP RESP QL NAA+PROBE: NEGATIVE
SODIUM SERPL-SCNC: 138 MMOL/L (ref 136–145)
SPECIFIC GRAVITY, URINE AUTO (.000) (OHS): 1.01 (ref 1–1.03)
VALPROATE SERPL-MCNC: 14.2 UG/ML (ref 50–100)
WBC # SPEC AUTO: 4.8 X10(3)/MCL (ref 4.5–11.5)

## 2022-09-12 PROCEDURE — 63600175 PHARM REV CODE 636 W HCPCS: Performed by: STUDENT IN AN ORGANIZED HEALTH CARE EDUCATION/TRAINING PROGRAM

## 2022-09-12 PROCEDURE — S4991 NICOTINE PATCH NONLEGEND: HCPCS | Performed by: STUDENT IN AN ORGANIZED HEALTH CARE EDUCATION/TRAINING PROGRAM

## 2022-09-12 PROCEDURE — 87635 SARS-COV-2 COVID-19 AMP PRB: CPT | Performed by: STUDENT IN AN ORGANIZED HEALTH CARE EDUCATION/TRAINING PROGRAM

## 2022-09-12 PROCEDURE — 80164 ASSAY DIPROPYLACETIC ACD TOT: CPT | Performed by: NURSE PRACTITIONER

## 2022-09-12 PROCEDURE — 25000003 PHARM REV CODE 250: Performed by: NURSE PRACTITIONER

## 2022-09-12 PROCEDURE — 25000003 PHARM REV CODE 250: Performed by: INTERNAL MEDICINE

## 2022-09-12 PROCEDURE — 85025 COMPLETE CBC W/AUTO DIFF WBC: CPT | Performed by: STUDENT IN AN ORGANIZED HEALTH CARE EDUCATION/TRAINING PROGRAM

## 2022-09-12 PROCEDURE — 80053 COMPREHEN METABOLIC PANEL: CPT | Performed by: STUDENT IN AN ORGANIZED HEALTH CARE EDUCATION/TRAINING PROGRAM

## 2022-09-12 PROCEDURE — 25000003 PHARM REV CODE 250: Performed by: STUDENT IN AN ORGANIZED HEALTH CARE EDUCATION/TRAINING PROGRAM

## 2022-09-12 PROCEDURE — 80307 DRUG TEST PRSMV CHEM ANLYZR: CPT | Performed by: STUDENT IN AN ORGANIZED HEALTH CARE EDUCATION/TRAINING PROGRAM

## 2022-09-12 PROCEDURE — 36415 COLL VENOUS BLD VENIPUNCTURE: CPT | Performed by: STUDENT IN AN ORGANIZED HEALTH CARE EDUCATION/TRAINING PROGRAM

## 2022-09-12 PROCEDURE — 11000001 HC ACUTE MED/SURG PRIVATE ROOM

## 2022-09-12 PROCEDURE — 85610 PROTHROMBIN TIME: CPT | Performed by: STUDENT IN AN ORGANIZED HEALTH CARE EDUCATION/TRAINING PROGRAM

## 2022-09-12 RX ORDER — DIVALPROEX SODIUM 250 MG/1
250 TABLET, FILM COATED, EXTENDED RELEASE ORAL DAILY
Status: DISCONTINUED | OUTPATIENT
Start: 2022-09-12 | End: 2022-09-13 | Stop reason: HOSPADM

## 2022-09-12 RX ORDER — RISPERIDONE 0.5 MG/1
0.5 TABLET ORAL 2 TIMES DAILY
Status: DISCONTINUED | OUTPATIENT
Start: 2022-09-12 | End: 2022-09-13 | Stop reason: HOSPADM

## 2022-09-12 RX ORDER — DIVALPROEX SODIUM 500 MG/1
500 TABLET, FILM COATED, EXTENDED RELEASE ORAL NIGHTLY
Status: DISCONTINUED | OUTPATIENT
Start: 2022-09-12 | End: 2022-09-13 | Stop reason: HOSPADM

## 2022-09-12 RX ADMIN — INSULIN ASPART 12 UNITS: 100 INJECTION, SOLUTION INTRAVENOUS; SUBCUTANEOUS at 06:09

## 2022-09-12 RX ADMIN — FERROUS SULFATE TAB 325 MG (65 MG ELEMENTAL FE) 1 EACH: 325 (65 FE) TAB at 04:09

## 2022-09-12 RX ADMIN — HYDROXYZINE HYDROCHLORIDE 25 MG: 25 TABLET ORAL at 08:09

## 2022-09-12 RX ADMIN — DOXYCYCLINE HYCLATE 100 MG: 100 TABLET, COATED ORAL at 08:09

## 2022-09-12 RX ADMIN — GABAPENTIN 400 MG: 300 CAPSULE ORAL at 08:09

## 2022-09-12 RX ADMIN — ASPIRIN 81 MG: 81 TABLET, COATED ORAL at 06:09

## 2022-09-12 RX ADMIN — AMOXICILLIN AND CLAVULANATE POTASSIUM 1 TABLET: 875; 125 TABLET, FILM COATED ORAL at 08:09

## 2022-09-12 RX ADMIN — GLIPIZIDE 5 MG: 5 TABLET ORAL at 04:09

## 2022-09-12 RX ADMIN — RISPERIDONE 0.5 MG: 0.5 TABLET ORAL at 01:09

## 2022-09-12 RX ADMIN — RISPERIDONE 0.5 MG: 0.5 TABLET ORAL at 08:09

## 2022-09-12 RX ADMIN — DIVALPROEX SODIUM 500 MG: 500 TABLET, FILM COATED, EXTENDED RELEASE ORAL at 08:09

## 2022-09-12 RX ADMIN — GLIPIZIDE 5 MG: 5 TABLET ORAL at 06:09

## 2022-09-12 RX ADMIN — INSULIN DETEMIR 30 UNITS: 100 INJECTION, SOLUTION SUBCUTANEOUS at 08:09

## 2022-09-12 RX ADMIN — DIVALPROEX SODIUM 250 MG: 250 TABLET, FILM COATED, EXTENDED RELEASE ORAL at 01:09

## 2022-09-12 RX ADMIN — PAROXETINE HYDROCHLORIDE 20 MG: 20 TABLET, FILM COATED ORAL at 06:09

## 2022-09-12 RX ADMIN — FERROUS SULFATE TAB 325 MG (65 MG ELEMENTAL FE) 1 EACH: 325 (65 FE) TAB at 12:09

## 2022-09-12 RX ADMIN — METHADONE HYDROCHLORIDE 70 MG: 10 TABLET ORAL at 01:09

## 2022-09-12 RX ADMIN — ATORVASTATIN CALCIUM 80 MG: 40 TABLET, FILM COATED ORAL at 08:09

## 2022-09-12 RX ADMIN — GABAPENTIN 400 MG: 300 CAPSULE ORAL at 01:09

## 2022-09-12 RX ADMIN — INSULIN ASPART 3 UNITS: 100 INJECTION, SOLUTION INTRAVENOUS; SUBCUTANEOUS at 09:09

## 2022-09-12 RX ADMIN — TRAZODONE HYDROCHLORIDE 50 MG: 50 TABLET ORAL at 08:09

## 2022-09-12 RX ADMIN — FOLIC ACID 1000 MCG: 1 TABLET ORAL at 08:09

## 2022-09-12 RX ADMIN — PANTOPRAZOLE SODIUM 40 MG: 40 TABLET, DELAYED RELEASE ORAL at 06:09

## 2022-09-12 RX ADMIN — WARFARIN SODIUM 5 MG: 5 TABLET ORAL at 04:09

## 2022-09-12 RX ADMIN — INSULIN ASPART 8 UNITS: 100 INJECTION, SOLUTION INTRAVENOUS; SUBCUTANEOUS at 11:09

## 2022-09-12 RX ADMIN — NICOTINE 1 PATCH: 21 PATCH, EXTENDED RELEASE TRANSDERMAL at 08:09

## 2022-09-12 RX ADMIN — FERROUS SULFATE TAB 325 MG (65 MG ELEMENTAL FE) 1 EACH: 325 (65 FE) TAB at 06:09

## 2022-09-12 RX ADMIN — INSULIN ASPART 15 UNITS: 100 INJECTION, SOLUTION INTRAVENOUS; SUBCUTANEOUS at 04:09

## 2022-09-12 RX ADMIN — HYDROXYZINE HYDROCHLORIDE 25 MG: 25 TABLET ORAL at 02:09

## 2022-09-12 NOTE — PLAN OF CARE
Notified by Dr. Goldman that he would like to speak with the Psych NP.  Called [facility] intake and spoke with Jena. She sent a message to the NP to call Dr. Goldman
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
## 2022-09-12 NOTE — PSYCH
Verified with nurse VPA not drawn this AM, will order for draw in AM. Stable at present. Fair sleep and appetite. Doesn't meet criteria for PEC at present.    Recommendations:  1.) VPA in AM  2.) psych to follow tomorrow (pending VPA)

## 2022-09-12 NOTE — PLAN OF CARE
09/12/22 1302   Discharge Reassessment   Assessment Type Discharge Planning Reassessment   Did the patient's condition or plan change since previous assessment? Yes   Discharge Plan discussed with: Patient;Spouse/sig other   Discharge Plan A Psychiatric hospital   Discharge Plan B Psychiatric hospital   DME Needed Upon Discharge  none   Discharge Barriers Identified None   Why the patient remains in the hospital Requires continued medical care   Post-Acute Status   Post-Acute Authorization Placement   Post-Acute Placement Status Referrals Sent  (Called into [facility]. Spoke with Leslie)

## 2022-09-12 NOTE — NURSING
Patient was found with altered mentation. Not oriented to time, place or person. Not following commands. At 1815 RRT was called. RRT nurse and respiratory came to bedside. Dr. Goldman notified. CT head, cbc, cmp, abg, UA ordered. Patient vitals and blood sugar stable. Will continue to monitor.

## 2022-09-13 VITALS
HEIGHT: 70 IN | SYSTOLIC BLOOD PRESSURE: 136 MMHG | WEIGHT: 200 LBS | TEMPERATURE: 99 F | OXYGEN SATURATION: 96 % | HEART RATE: 61 BPM | BODY MASS INDEX: 28.63 KG/M2 | DIASTOLIC BLOOD PRESSURE: 58 MMHG | RESPIRATION RATE: 18 BRPM

## 2022-09-13 LAB
INR BLD: 3.37 (ref 0–1.3)
POCT GLUCOSE: 155 MG/DL (ref 70–110)
POCT GLUCOSE: 264 MG/DL (ref 70–110)
POCT GLUCOSE: 99 MG/DL (ref 70–110)
PROTHROMBIN TIME: 33.4 SECONDS (ref 12.5–14.5)

## 2022-09-13 PROCEDURE — 63600175 PHARM REV CODE 636 W HCPCS: Performed by: STUDENT IN AN ORGANIZED HEALTH CARE EDUCATION/TRAINING PROGRAM

## 2022-09-13 PROCEDURE — 85610 PROTHROMBIN TIME: CPT | Performed by: STUDENT IN AN ORGANIZED HEALTH CARE EDUCATION/TRAINING PROGRAM

## 2022-09-13 PROCEDURE — 36415 COLL VENOUS BLD VENIPUNCTURE: CPT | Performed by: STUDENT IN AN ORGANIZED HEALTH CARE EDUCATION/TRAINING PROGRAM

## 2022-09-13 PROCEDURE — 25000003 PHARM REV CODE 250: Performed by: STUDENT IN AN ORGANIZED HEALTH CARE EDUCATION/TRAINING PROGRAM

## 2022-09-13 PROCEDURE — 25000003 PHARM REV CODE 250: Performed by: NURSE PRACTITIONER

## 2022-09-13 PROCEDURE — 25000003 PHARM REV CODE 250: Performed by: INTERNAL MEDICINE

## 2022-09-13 PROCEDURE — S4991 NICOTINE PATCH NONLEGEND: HCPCS | Performed by: STUDENT IN AN ORGANIZED HEALTH CARE EDUCATION/TRAINING PROGRAM

## 2022-09-13 RX ORDER — TRAZODONE HYDROCHLORIDE 50 MG/1
50 TABLET ORAL NIGHTLY
Qty: 30 TABLET | Refills: 11 | Status: SHIPPED | OUTPATIENT
Start: 2022-09-13 | End: 2023-09-13

## 2022-09-13 RX ORDER — DOXYCYCLINE 100 MG/1
100 CAPSULE ORAL EVERY 12 HOURS
Qty: 6 CAPSULE | Refills: 0 | Status: SHIPPED | OUTPATIENT
Start: 2022-09-13 | End: 2022-09-16

## 2022-09-13 RX ORDER — AMOXICILLIN AND CLAVULANATE POTASSIUM 875; 125 MG/1; MG/1
1 TABLET, FILM COATED ORAL EVERY 12 HOURS
Qty: 6 TABLET | Refills: 0 | Status: SHIPPED | OUTPATIENT
Start: 2022-09-13 | End: 2022-09-16

## 2022-09-13 RX ORDER — DIVALPROEX SODIUM 500 MG/1
500 TABLET, FILM COATED, EXTENDED RELEASE ORAL NIGHTLY
Qty: 30 TABLET | Refills: 11 | Status: SHIPPED | OUTPATIENT
Start: 2022-09-13 | End: 2023-09-13

## 2022-09-13 RX ORDER — DIVALPROEX SODIUM 250 MG/1
250 TABLET, FILM COATED, EXTENDED RELEASE ORAL DAILY
Qty: 30 TABLET | Refills: 11 | Status: SHIPPED | OUTPATIENT
Start: 2022-09-14 | End: 2023-09-14

## 2022-09-13 RX ORDER — INSULIN ASPART 100 [IU]/ML
1-15 INJECTION, SOLUTION INTRAVENOUS; SUBCUTANEOUS
Qty: 10 ML | Refills: 0 | OUTPATIENT
Start: 2022-09-13 | End: 2022-10-13

## 2022-09-13 RX ORDER — GLIPIZIDE 5 MG/1
10 TABLET ORAL 2 TIMES DAILY WITH MEALS
Qty: 120 TABLET | Refills: 11 | Status: SHIPPED | OUTPATIENT
Start: 2022-09-13 | End: 2023-09-13

## 2022-09-13 RX ORDER — RISPERIDONE 0.5 MG/1
0.5 TABLET ORAL 2 TIMES DAILY
Qty: 60 TABLET | Refills: 11 | Status: SHIPPED | OUTPATIENT
Start: 2022-09-13 | End: 2023-09-13

## 2022-09-13 RX ORDER — WARFARIN 2.5 MG/1
2.5 TABLET ORAL DAILY
Status: DISCONTINUED | OUTPATIENT
Start: 2022-09-13 | End: 2022-09-13 | Stop reason: HOSPADM

## 2022-09-13 RX ADMIN — DIVALPROEX SODIUM 250 MG: 250 TABLET, FILM COATED, EXTENDED RELEASE ORAL at 08:09

## 2022-09-13 RX ADMIN — PAROXETINE HYDROCHLORIDE 20 MG: 20 TABLET, FILM COATED ORAL at 06:09

## 2022-09-13 RX ADMIN — FERROUS SULFATE TAB 325 MG (65 MG ELEMENTAL FE) 1 EACH: 325 (65 FE) TAB at 08:09

## 2022-09-13 RX ADMIN — FOLIC ACID 1000 MCG: 1 TABLET ORAL at 08:09

## 2022-09-13 RX ADMIN — METHADONE HYDROCHLORIDE 70 MG: 10 TABLET ORAL at 04:09

## 2022-09-13 RX ADMIN — ASPIRIN 81 MG: 81 TABLET, COATED ORAL at 05:09

## 2022-09-13 RX ADMIN — GLIPIZIDE 5 MG: 5 TABLET ORAL at 08:09

## 2022-09-13 RX ADMIN — INSULIN ASPART 2 UNITS: 100 INJECTION, SOLUTION INTRAVENOUS; SUBCUTANEOUS at 04:09

## 2022-09-13 RX ADMIN — PANTOPRAZOLE SODIUM 40 MG: 40 TABLET, DELAYED RELEASE ORAL at 06:09

## 2022-09-13 RX ADMIN — NICOTINE 1 PATCH: 21 PATCH, EXTENDED RELEASE TRANSDERMAL at 08:09

## 2022-09-13 RX ADMIN — FERROUS SULFATE TAB 325 MG (65 MG ELEMENTAL FE) 1 EACH: 325 (65 FE) TAB at 11:09

## 2022-09-13 RX ADMIN — DOXYCYCLINE HYCLATE 100 MG: 100 TABLET, COATED ORAL at 08:09

## 2022-09-13 RX ADMIN — RISPERIDONE 0.5 MG: 0.5 TABLET ORAL at 08:09

## 2022-09-13 RX ADMIN — AMOXICILLIN AND CLAVULANATE POTASSIUM 1 TABLET: 875; 125 TABLET, FILM COATED ORAL at 08:09

## 2022-09-13 RX ADMIN — GABAPENTIN 400 MG: 300 CAPSULE ORAL at 04:09

## 2022-09-13 RX ADMIN — GABAPENTIN 400 MG: 300 CAPSULE ORAL at 08:09

## 2022-09-13 RX ADMIN — HYDROXYZINE HYDROCHLORIDE 25 MG: 25 TABLET ORAL at 04:09

## 2022-09-13 RX ADMIN — HYDROXYZINE HYDROCHLORIDE 25 MG: 25 TABLET ORAL at 08:09

## 2022-09-13 NOTE — CLINICAL REVIEW
61-year-old male admitted on 09/04/2022 for acute on chronic encephalopathy.  Hemodynamically stable, afebrile.  CT scan of the head was negative.  The patient did have right lower extremity cellulitis which was treated with broad-spectrum anti-infective therapy.  The patient was placed on a physician emergency commitment.  The patient was evaluated by Psychiatry and was transferred to a psychiatric level of care.  Based on the [facility] 26th Edition 2022 General Recovery Care > Body System General Recovery Guidelines >Neurology GRG (MG-N)    Hospital admission is needed for appropriate care of the patient because of 1 or more of the following:  Encephalitis(1)(2)(3)(4)(5)(6)(7)(8)(9)(10)  Severe CNS infection  Altered mental status that is severe or persistent  Mental status change requiring inpatient care  Cerebral aneurysm requiring acute treatment  Neurologic finding requiring inpatient care  Ataxia  Guillain-Courtland syndrome (example variants include: acute inflammatory demyelinating polyradiculoneuropathy (AIDP), acute motor axonal neuropathy (JACINDA), and Ngo Ward syndrome)(38)(42)(54)(55)(56)  Myasthenia gravis crisis or inpatient monitoring need  Multiple sclerosis or other demyelinating disease[A] exacerbation requiring inpatient care  Acute deterioration requiring inpatient treatment (eg, parenteral medication, plasmapheresis)  Severe pain requiring acute inpatient management  Respiratory insufficiency requiring intubation or inpatient monitoring  Inability to manage secretions (aspiration)  Weakness that is progressive or severe (eg, inpatient care needed due to functional disability, concern for safety)(41)  Intracranial hypertension (eg, pseudotumor cerebri) requiring inpatient care (eg, acute visual loss, inadequate oral intake)(65)(66)(67)(68)  Intracranial mass or process (eg, edema) causing acute neurologic signs or symptoms (eg, Altered mental status, focal neurologic finding)  Parkinson disease  requiring inpatient care  Amyotrophic lateral sclerosis with inpatient care needs  Severe neuropathy or neuromuscular disease  Cerebral venous thrombosis[B](85)(86)(87)(88)(89)(90)  Complications of congenital or degenerative disease (eg, infection, seizures, dehydration, injury) not responsive to observation care (as appropriate)[C](91)(92)(93)  Suspected or confirmed nerve toxin injury (eg, botulism)(38)(94)  Neurologic trauma requiring inpatient treatment (medical)  Complications of neurologic devices (eg, ventricular shunt, neurostimulator) requiring  Isolation indicated that cannot be performed outside hospital setting  Neurologic condition, symptom, or finding for which observation care has failed or is not considered appropriate.    2/2 to a lack of the following, IP LOC was not MN from 09/12/2022 forward.    Maria Del Carmen Otero MD  Utilization Management  Physician Advisor
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
## 2022-09-13 NOTE — PLAN OF CARE
Problem: Adult Inpatient Plan of Care  Goal: Plan of Care Review  Outcome: Ongoing, Progressing  Goal: Patient-Specific Goal (Individualized)  Outcome: Ongoing, Progressing  Goal: Absence of Hospital-Acquired Illness or Injury  Outcome: Ongoing, Progressing  Goal: Optimal Comfort and Wellbeing  Outcome: Ongoing, Progressing  Goal: Readiness for Transition of Care  Outcome: Ongoing, Progressing     Problem: Impaired Wound Healing  Goal: Optimal Wound Healing  Outcome: Ongoing, Progressing     Problem: Skin Injury Risk Increased  Goal: Skin Health and Integrity  Outcome: Ongoing, Progressing     Problem: Fall Injury Risk  Goal: Absence of Fall and Fall-Related Injury  Outcome: Ongoing, Progressing     Problem: Diabetes Comorbidity  Goal: Blood Glucose Level Within Targeted Range  Outcome: Ongoing, Progressing     Problem: Thought Process Alteration  Goal: Optimal Thought Clarity  Outcome: Ongoing, Progressing

## 2022-09-13 NOTE — PSYCH
9/12/2022 8:58 PM   Christian Stroud   1960   [id number]       Psychiatry Consult Progress Note     SUBJECTIVE:   Christian Stroud is a 61 y.o. male seen for follow-up for bipolar disorder.    Mr. Stroud reports mild depression, rating it 3/10 with 10 being severe, and anxiety, rating it 2/10 with 10 being severe.  He says that these symptoms started since he has realized that he truly cannot go back home with his wife.  He reports that he has been sleeping much better since starting his mental health meds.  He denies an elevated mood.  He endorses racing thoughts.  He denies SI or HI.  He admits to addictive behaviors recently.  He reports that he went into his wife's purse recently looking for Ambien, while at the hospital.  He says that he found some and he took 2.5 10 mg tabs.  He says that he was craving the Ambien.  He admits that he was looking to get high and he accomplished that with those 2.5 tabs.  He says that he was recently abusing Ambien, which is why is PCP stopped him from taking them.  He wife pointed out that the sugar addiction started after he was decreased on his Methadone from 130 mg to 70 mg.  She says that this started about three months ago.  Mr. Stroud agrees with this.  He says that he just craves the sugar just as he would other drugs.  He says that he is aware of the negative effects that the sugar causes on his medical state.      Mr. Stroud says that he wants to go back to the [facility] in [place], [place] for residential substance abuse treatment.  He says that he went there about eight years ago and did very well over there.  He says that he trusts their program because he has been successful with their program in the past.  He and his wife says that he has already called them for admission there.       Current Medications:   Scheduled Meds:    amoxicillin-clavulanate 875-125mg  1 tablet Oral Q12H    aspirin  81 mg Oral Daily    atorvastatin  80 mg Oral Nightly    divalproex ER  500 mg  Oral QHS    divalproex ER  250 mg Oral Daily    doxycycline  100 mg Oral Q12H    dulaglutide  1.5 mg Subcutaneous Every Thurs    ferrous sulfate  1 tablet Oral TID WM    folic acid  1,000 mcg Oral Daily    gabapentin  400 mg Oral TID    glipiZIDE  5 mg Oral BID WM    hydrOXYzine HCL  25 mg Oral TID    insulin detemir U-100  30 Units Subcutaneous QHS    methadone  70 mg Oral Daily    nicotine  1 patch Transdermal Daily    pantoprazole  40 mg Oral QAM    paroxetine  20 mg Oral QAM    risperiDONE  0.5 mg Oral BID    trazodone  50 mg Oral QHS    warfarin  5 mg Oral Daily      PRN Meds: dextrose 10%, dextrose 10%, glucagon (human recombinant), glucose, glucose, haloperidol lactate, insulin aspart U-100, lorazepam, naloxone, sodium chloride 0.9%   Psychotherapeutics (From admission, onward)      Start     Stop Route Frequency Ordered    09/12/22 1200  risperiDONE tablet 0.5 mg         -- Oral 2 times daily 09/12/22 1158    09/07/22 2100  traZODone tablet 50 mg         -- Oral Nightly 09/07/22 1341    09/05/22 0700  paroxetine tablet 20 mg         -- Oral Every morning 09/04/22 2327    09/04/22 2321  haloperidol lactate injection 5 mg         -- IM Every 6 hours PRN 09/04/22 2222 09/04/22 2320  lorazepam (ATIVAN) injection 1 mg         -- IV Every 2 hours PRN 09/04/22 2222            Allergies:   Review of patient's allergies indicates:  No Known Allergies     OBJECTIVE:   Vitals   Vitals:    09/12/22 1913   BP: (!) 128/56   Pulse: (!) 59   Resp: 18   Temp: 98.1 °F (36.7 °C)        Labs/Imaging/Studies:   Recent Results (from the past 36 hour(s))   POCT glucose    Collection Time: 09/11/22 11:53 AM   Result Value Ref Range    POCT Glucose 144 (H) 70 - 110 mg/dL   POCT glucose    Collection Time: 09/11/22  4:09 PM   Result Value Ref Range    POCT Glucose 260 (H) 70 - 110 mg/dL   POCT glucose    Collection Time: 09/11/22  6:15 PM   Result Value Ref Range    POCT Glucose 260 (H) 70 - 110 mg/dL   Comprehensive Metabolic  Panel    Collection Time: 09/11/22  7:33 PM   Result Value Ref Range    Sodium Level 138 136 - 145 mmol/L    Potassium Level 4.5 3.5 - 5.1 mmol/L    Chloride 106 98 - 107 mmol/L    Carbon Dioxide 25 23 - 31 mmol/L    Glucose Level 220 (H) 82 - 115 mg/dL    Blood Urea Nitrogen 17.4 8.4 - 25.7 mg/dL    Creatinine 0.92 0.73 - 1.18 mg/dL    Calcium Level Total 8.7 (L) 8.8 - 10.0 mg/dL    Protein Total 6.2 5.8 - 7.6 gm/dL    Albumin Level 2.8 (L) 3.4 - 4.8 gm/dL    Globulin 3.4 2.4 - 3.5 gm/dL    Albumin/Globulin Ratio 0.8 (L) 1.1 - 2.0 ratio    Bilirubin Total 0.2 <=1.5 mg/dL    Alkaline Phosphatase 84 40 - 150 unit/L    Alanine Aminotransferase 17 0 - 55 unit/L    Aspartate Aminotransferase 20 5 - 34 unit/L    eGFR >60 mls/min/1.73/m2   CBC with Differential    Collection Time: 09/11/22  7:33 PM   Result Value Ref Range    WBC 4.4 (L) 4.5 - 11.5 x10(3)/mcL    RBC 3.48 (L) 4.70 - 6.10 x10(6)/mcL    Hgb 9.3 (L) 14.0 - 18.0 gm/dL    Hct 30.2 (L) 42.0 - 52.0 %    MCV 86.8 80.0 - 94.0 fL    MCH 26.7 (L) 27.0 - 31.0 pg    MCHC 30.8 (L) 33.0 - 36.0 mg/dL    RDW 17.0 11.5 - 17.0 %    Platelet 220 130 - 400 x10(3)/mcL    MPV 9.9 7.4 - 10.4 fL    Neut % 45.4 %    Lymph % 34.4 %    Mono % 11.9 %    Eos % 6.7 %    Basophil % 0.7 %    Lymph # 1.50 0.6 - 4.6 x10(3)/mcL    Neut # 2.0 (L) 2.1 - 9.2 x10(3)/mcL    Mono # 0.52 0.1 - 1.3 x10(3)/mcL    Eos # 0.29 0 - 0.9 x10(3)/mcL    Baso # 0.03 0 - 0.2 x10(3)/mcL    IG# 0.04 0 - 0.04 x10(3)/mcL    IG% 0.9 %    NRBC% 0.0 %   POCT glucose    Collection Time: 09/11/22  8:09 PM   Result Value Ref Range    POCT Glucose 227 (H) 70 - 110 mg/dL   POCT glucose    Collection Time: 09/12/22 12:14 AM   Result Value Ref Range    POCT Glucose 135 (H) 70 - 110 mg/dL   Drug Screen, Urine    Collection Time: 09/12/22  2:59 AM   Result Value Ref Range    Amphetamines, Urine Negative Negative    Barbituates, Urine Negative Negative    Benzodiazepine, Urine Negative Negative    Cannabinoids, Urine Negative  Negative    Cocaine, Urine Negative Negative    Fentanyl, Urine Negative Negative    MDMA, Urine Negative Negative    Opiates, Urine Negative Negative    Phencyclidine, Urine Negative Negative    pH, Urine 6.5 3.0 - 11.0    Specific Gravity, Urine Auto 1.015 1.001 - 1.035   Comprehensive Metabolic Panel    Collection Time: 09/12/22  4:34 AM   Result Value Ref Range    Sodium Level 138 136 - 145 mmol/L    Potassium Level 4.7 3.5 - 5.1 mmol/L    Chloride 106 98 - 107 mmol/L    Carbon Dioxide 26 23 - 31 mmol/L    Glucose Level 236 (H) 82 - 115 mg/dL    Blood Urea Nitrogen 15.4 8.4 - 25.7 mg/dL    Creatinine 0.88 0.73 - 1.18 mg/dL    Calcium Level Total 8.7 (L) 8.8 - 10.0 mg/dL    Protein Total 6.2 5.8 - 7.6 gm/dL    Albumin Level 2.8 (L) 3.4 - 4.8 gm/dL    Globulin 3.4 2.4 - 3.5 gm/dL    Albumin/Globulin Ratio 0.8 (L) 1.1 - 2.0 ratio    Bilirubin Total 0.2 <=1.5 mg/dL    Alkaline Phosphatase 75 40 - 150 unit/L    Alanine Aminotransferase 19 0 - 55 unit/L    Aspartate Aminotransferase 21 5 - 34 unit/L    eGFR >60 mls/min/1.73/m2   Valproic Acid    Collection Time: 09/12/22  4:34 AM   Result Value Ref Range    Valproic Acid Level 14.2 (L) 50.0 - 100.0 ug/ml   CBC with Differential    Collection Time: 09/12/22  4:34 AM   Result Value Ref Range    WBC 4.8 4.5 - 11.5 x10(3)/mcL    RBC 3.72 (L) 4.70 - 6.10 x10(6)/mcL    Hgb 9.8 (L) 14.0 - 18.0 gm/dL    Hct 32.9 (L) 42.0 - 52.0 %    MCV 88.4 80.0 - 94.0 fL    MCH 26.3 (L) 27.0 - 31.0 pg    MCHC 29.8 (L) 33.0 - 36.0 mg/dL    RDW 17.1 (H) 11.5 - 17.0 %    Platelet 215 130 - 400 x10(3)/mcL    MPV 9.1 7.4 - 10.4 fL    Neut % 51.1 %    Lymph % 31.3 %    Mono % 11.0 %    Eos % 5.2 %    Basophil % 0.6 %    Lymph # 1.50 0.6 - 4.6 x10(3)/mcL    Neut # 2.5 2.1 - 9.2 x10(3)/mcL    Mono # 0.53 0.1 - 1.3 x10(3)/mcL    Eos # 0.25 0 - 0.9 x10(3)/mcL    Baso # 0.03 0 - 0.2 x10(3)/mcL    IG# 0.04 0 - 0.04 x10(3)/mcL    IG% 0.8 %    NRBC% 0.0 %   POCT glucose    Collection Time: 09/12/22  6:03  AM   Result Value Ref Range    POCT Glucose 317 (H) 70 - 110 mg/dL   Protime-INR    Collection Time: 09/12/22  9:03 AM   Result Value Ref Range    PT 26.4 (H) 12.5 - 14.5 seconds    INR 2.48 (H) 0.00 - 1.30   POCT glucose    Collection Time: 09/12/22 11:10 AM   Result Value Ref Range    POCT Glucose 298 (H) 70 - 110 mg/dL   COVID-19 Rapid Screening    Collection Time: 09/12/22  1:46 PM   Result Value Ref Range    SARS COV-2 MOLECULAR Negative Negative   POCT glucose    Collection Time: 09/12/22  4:38 PM   Result Value Ref Range    POCT Glucose 387 (H) 70 - 110 mg/dL   POCT glucose    Collection Time: 09/12/22  8:31 PM   Result Value Ref Range    POCT Glucose 280 (H) 70 - 110 mg/dL            Psychiatric Mental Status Exam:  General Appearance: dressed in casual attire  Arousal: alert  Behavior: normal, cooperative  Movements and Motor Activity: no tics, no tremors, no akathisia, no dystonia, no evidence of tardive dyskinesia  Orientation: grossly intact  Speech: normal volume, normal tone, normal pitch, talkative  Mood: Dysphoric  Affect: mood-congruent, worried  Thought Process: goal-directed  Associations: intact, no loosening of associations  Thought Content and Perceptions: no suicidal or homicidal ideation, no auditory or visual hallucinations, no paranoid ideation, no ideas of reference, no evidence of delusions or psychosis  Recent and Remote Memory: grossly intact, able to recall relevant and salient information from the recent and remote past  Attention and Concentration: grossly intact  Fund of Knowledge: grossly intact  Insight:  Fair  Judgment: impaired due to addiction; desiring to get high    ASSESSMENT/PLAN:   Diagnosis:  Bipolar Disorder  Substance Use Disorder    Past Medical History:   Diagnosis Date    Anticoagulant long-term use     Bipolar disorder     Diabetes mellitus     Hx of psychiatric care     Hypertension     Lorena     Psychiatric problem     S/P AVR (aortic valve replacement)      Substance abuse         Recommendations:   Mr. Stroud started with risperidone today as well as an additional dose of Depakote  Continue current meds.  He does not meet criteria for PEC.  Recommend a dual diagnosis substance abuse treatment facilty.  He does have plans on going to a facility named [facility] in [place], [place].  Re-consult psych if needed.  If client does not go to residential treatment program, I do recommend Intensive Outpatient Treatment such as [facility].          Hannah Gaitan

## 2022-12-13 ENCOUNTER — PATIENT MESSAGE (OUTPATIENT)
Dept: RESEARCH | Facility: HOSPITAL | Age: 62
End: 2022-12-13
Payer: COMMERCIAL

## 2023-09-07 ENCOUNTER — PATIENT MESSAGE (OUTPATIENT)
Dept: RESEARCH | Facility: HOSPITAL | Age: 63
End: 2023-09-07
Payer: COMMERCIAL